# Patient Record
Sex: MALE | Race: WHITE | URBAN - METROPOLITAN AREA
[De-identification: names, ages, dates, MRNs, and addresses within clinical notes are randomized per-mention and may not be internally consistent; named-entity substitution may affect disease eponyms.]

---

## 2018-09-27 ENCOUNTER — OFFICE VISIT CONVERTED (OUTPATIENT)
Dept: GASTROENTEROLOGY | Facility: CLINIC | Age: 48
End: 2018-09-27
Attending: NURSE PRACTITIONER

## 2018-12-17 ENCOUNTER — OFFICE VISIT CONVERTED (OUTPATIENT)
Dept: GASTROENTEROLOGY | Facility: CLINIC | Age: 48
End: 2018-12-17
Attending: NURSE PRACTITIONER

## 2019-01-04 ENCOUNTER — HOSPITAL ENCOUNTER (OUTPATIENT)
Dept: LAB | Facility: HOSPITAL | Age: 49
Discharge: HOME | End: 2019-01-04
Attending: NURSE PRACTITIONER

## 2019-04-17 ENCOUNTER — OFFICE VISIT CONVERTED (OUTPATIENT)
Dept: GASTROENTEROLOGY | Facility: CLINIC | Age: 49
End: 2019-04-17
Attending: NURSE PRACTITIONER

## 2021-05-15 VITALS
SYSTOLIC BLOOD PRESSURE: 124 MMHG | WEIGHT: 187 LBS | HEIGHT: 73 IN | DIASTOLIC BLOOD PRESSURE: 84 MMHG | BODY MASS INDEX: 24.78 KG/M2

## 2021-05-15 VITALS
BODY MASS INDEX: 24.52 KG/M2 | SYSTOLIC BLOOD PRESSURE: 128 MMHG | DIASTOLIC BLOOD PRESSURE: 83 MMHG | WEIGHT: 185 LBS | HEIGHT: 73 IN

## 2021-05-16 VITALS
WEIGHT: 184.5 LBS | SYSTOLIC BLOOD PRESSURE: 126 MMHG | BODY MASS INDEX: 24.45 KG/M2 | HEIGHT: 73 IN | HEART RATE: 78 BPM | DIASTOLIC BLOOD PRESSURE: 90 MMHG

## 2022-05-14 ENCOUNTER — HOSPITAL ENCOUNTER (EMERGENCY)
Dept: HOSPITAL 5 - ED | Age: 52
Discharge: STILL ADMITTED OR ADMINISTRATIVE | End: 2022-05-14
Payer: SELF-PAY

## 2022-05-14 VITALS — DIASTOLIC BLOOD PRESSURE: 91 MMHG | SYSTOLIC BLOOD PRESSURE: 135 MMHG

## 2022-05-14 DIAGNOSIS — G93.41: ICD-10-CM

## 2022-05-14 DIAGNOSIS — I10: ICD-10-CM

## 2022-05-14 DIAGNOSIS — Z79.899: ICD-10-CM

## 2022-05-14 DIAGNOSIS — I50.21: ICD-10-CM

## 2022-05-14 DIAGNOSIS — F17.200: ICD-10-CM

## 2022-05-14 DIAGNOSIS — J96.01: Primary | ICD-10-CM

## 2022-05-14 LAB
ALBUMIN SERPL-MCNC: 3.2 G/DL (ref 3.9–5)
ALT SERPL-CCNC: 20 UNITS/L (ref 7–56)
BASOPHILS # (AUTO): 0 K/MM3 (ref 0–0.1)
BASOPHILS NFR BLD AUTO: 0.5 % (ref 0–1.8)
BILIRUB UR QL STRIP: (no result)
BLOOD UR QL VISUAL: (no result)
BUN SERPL-MCNC: 7 MG/DL (ref 9–20)
BUN/CREAT SERPL: 12 %
CALCIUM SERPL-MCNC: 8.5 MG/DL (ref 8.4–10.2)
EOSINOPHIL # BLD AUTO: 0 K/MM3 (ref 0–0.4)
EOSINOPHIL NFR BLD AUTO: 0.1 % (ref 0–4.3)
HCT VFR BLD CALC: 38.2 % (ref 35.5–45.6)
HEMOLYSIS INDEX: 1
HGB BLD-MCNC: 12.4 GM/DL (ref 11.8–15.2)
LYMPHOCYTES # BLD AUTO: 1.9 K/MM3 (ref 1.2–5.4)
LYMPHOCYTES NFR BLD AUTO: 20.6 % (ref 13.4–35)
MCHC RBC AUTO-ENTMCNC: 33 % (ref 32–34)
MCV RBC AUTO: 87 FL (ref 84–94)
MONOCYTES # (AUTO): 0.7 K/MM3 (ref 0–0.8)
MONOCYTES % (AUTO): 7.9 % (ref 0–7.3)
PH UR STRIP: 5 [PH] (ref 5–7)
PLATELET # BLD: 179 K/MM3 (ref 140–440)
RBC # BLD AUTO: 4.38 M/MM3 (ref 3.65–5.03)
RBC #/AREA URNS HPF: 0 /HPF (ref 0–6)
UROBILINOGEN UR-MCNC: 4 MG/DL (ref ?–2)
WBC #/AREA URNS HPF: 0 /HPF (ref 0–6)

## 2022-05-14 PROCEDURE — 80320 DRUG SCREEN QUANTALCOHOLS: CPT

## 2022-05-14 PROCEDURE — 83880 ASSAY OF NATRIURETIC PEPTIDE: CPT

## 2022-05-14 PROCEDURE — G0480 DRUG TEST DEF 1-7 CLASSES: HCPCS

## 2022-05-14 PROCEDURE — 99285 EMERGENCY DEPT VISIT HI MDM: CPT

## 2022-05-14 PROCEDURE — 96365 THER/PROPH/DIAG IV INF INIT: CPT

## 2022-05-14 PROCEDURE — 81001 URINALYSIS AUTO W/SCOPE: CPT

## 2022-05-14 PROCEDURE — 82140 ASSAY OF AMMONIA: CPT

## 2022-05-14 PROCEDURE — 83690 ASSAY OF LIPASE: CPT

## 2022-05-14 PROCEDURE — 93005 ELECTROCARDIOGRAM TRACING: CPT

## 2022-05-14 PROCEDURE — 96368 THER/DIAG CONCURRENT INF: CPT

## 2022-05-14 PROCEDURE — 84484 ASSAY OF TROPONIN QUANT: CPT

## 2022-05-14 PROCEDURE — 70450 CT HEAD/BRAIN W/O DYE: CPT

## 2022-05-14 PROCEDURE — 80053 COMPREHEN METABOLIC PANEL: CPT

## 2022-05-14 PROCEDURE — 71045 X-RAY EXAM CHEST 1 VIEW: CPT

## 2022-05-14 PROCEDURE — 96375 TX/PRO/DX INJ NEW DRUG ADDON: CPT

## 2022-05-14 PROCEDURE — 36415 COLL VENOUS BLD VENIPUNCTURE: CPT

## 2022-05-14 PROCEDURE — 96361 HYDRATE IV INFUSION ADD-ON: CPT

## 2022-05-14 PROCEDURE — 80307 DRUG TEST PRSMV CHEM ANLYZR: CPT

## 2022-05-14 PROCEDURE — 74177 CT ABD & PELVIS W/CONTRAST: CPT

## 2022-05-14 PROCEDURE — 85025 COMPLETE CBC W/AUTO DIFF WBC: CPT

## 2022-05-14 PROCEDURE — 87040 BLOOD CULTURE FOR BACTERIA: CPT

## 2022-05-14 NOTE — CAT SCAN REPORT
CT HEAD WITHOUT CONTRAST



INDICATION / CLINICAL INFORMATION: Altered mental status.



TECHNIQUE: All CT scans at this location are performed using CT dose reduction for ALARA by means of 
automated exposure control. 



COMPARISON: None available.



FINDINGS:



Motion artifact limits this exam.



BRAIN PARENCHYMA: No acute intracranial hemorrhage. No evidence of recent infarct. No mass effect or 
midline shift.

VENTRICULAR SYSTEM/EXTRA-AXIAL SPACES: Ventricles are normal for age. No extra-axial fluid collection
. 

ORBITS: Normal as visualized.

SKELETAL SYSTEM/SOFT TISSUES: Normal bones and soft tissues.

PARANASAL SINUSES/MASTOID AIR CELLS: No significant abnormality.



ADDITIONAL FINDINGS: None.



IMPRESSION:

1. No acute intracranial abnormality.



Signer Name: Ziyad Hoffmann MD 

Signed: 5/14/2022 10:45 AM

Workstation Name: [id number]

## 2022-05-14 NOTE — EMERGENCY DEPARTMENT REPORT
<JANUARY LÓPEZ - Last Filed: 05/14/22 05:49>





ED N/V/D HPI





- General


Chief complaint: Nausea/Vomiting/Diarrhea


Stated complaint: ABDOMINAL PAIN/HEMATEMESIS


Time Seen by Provider: 05/14/22 03:23


Source: patient, EMS


Mode of arrival: Stretcher


Limitations: No Limitations





- History of Present Illness


Initial comments: 





Patient is a 52-year-old male with apparent history of coronary artery disease 

with stent placement who presents emergency department complaint of chest pain, 

abdominal pain.  Patient is writhing in pain but cannot give much additional 

history.  He states that he has not been drinking although the EMS report states

that patient was drinking alcohol.





- Related Data


                                    Allergies











Allergy/AdvReac Type Severity Reaction Status Date / Time


 


No Known Allergies Allergy   Unverified 05/14/22 02:35














ED Review of Systems


Comment: Unobtainable due to pts medical conditions





ED Past Medical Hx





- Past Medical History


Previous Medical History?: Yes


Hx Hypertension: Yes


Hx Heart Attack/AMI: Yes


Hx Diabetes: Yes





- Surgical History


Past Surgical History?: No





- Social History


Smoking Status: Current Every Day Smoker


Substance Use Type: None





ED Physical Exam





- General


Limitations: No Limitations


General appearance: alert, in distress





- Head


Head exam: Present: atraumatic, normocephalic





- Eye


Eye exam: Present: normal appearance





- ENT


ENT exam: Present: mucous membranes dry





- Neck


Neck exam: Present: normal inspection





- Respiratory


Respiratory exam: Present: normal lung sounds bilaterally.  Absent: respiratory 

distress





- Cardiovascular


Cardiovascular Exam: Present: regular rate, normal rhythm.  Absent: systolic 

murmur, diastolic murmur, rubs, gallop





- GI/Abdominal


GI/Abdominal exam: Present: soft.  Absent: distended, tenderness





- Rectal


Rectal exam: Present: deferred





- Extremities Exam


Extremities exam: Present: normal inspection





- Back Exam


Back exam: Present: normal inspection





- Neurological Exam


Neurological exam: Present: alert, altered





- Psychiatric


Psychiatric exam: Present: other (Unable to assess)





- Skin


Skin exam: Present: warm, dry, intact, normal color.  Absent: rash





ED Course





- Reevaluation(s)


Reevaluation #1: 





05/14/22 05:51


Patient's chest x-ray shows possible pneumonia.  Given his blood cultures, 

lactic acid and antibiotics are ordered.





ED Medical Decision Making





- Lab Data


Result diagrams: 


                                 05/14/22 03:54





                                 05/14/22 03:54





- EKG Data


-: EKG Interpreted by Me


EKG shows normal: sinus rhythm


Rate: tachycardia


  ** No standard instances


Rhythm: PVC's


Axis/QRS: left axis deviation





- Radiology Data


Radiology results: report reviewed, image reviewed





- Medical Decision Making





Patient is a 52-year-old male presented emergency department with complaint of 

altered mental status, chest pain, abdominal pain.  The report was that patient 

was drinking alcohol.  Plan for work-up including chest x-ray, CT abdomen 

pelvis.  Given his altered ental status a CT scan of the brain is also ordered. 

Disposition pending work-up.  Differential includes gastritis, pancreatitis, 

ACS.





ED Disposition


Clinical Impression: 


 Acute respiratory failure with hypoxia, New onset of congestive heart failure, 

Metabolic encephalopathy





Disposition: 09 ADMITTED AS INPATIENT


Condition: Stable





<CAROLINA WAGNER - Last Filed: 05/14/22 11:19>





ED Review of Systems


ROS: 


Stated complaint: ABDOMINAL PAIN/HEMATEMESIS


Other details as noted in HPI








ED Course





                                   Vital Signs











  05/14/22 05/14/22 05/14/22





  02:00 03:10 03:16


 


Temperature 99.6 F  


 


Pulse Rate 109 H 109 H 108 H


 


Respiratory 20 22 24





Rate   


 


Blood Pressure 153/106  


 


Blood Pressure   





[Left]   


 


O2 Sat by Pulse 100 93 93





Oximetry   














  05/14/22 05/14/22 05/14/22





  03:30 03:46 04:00


 


Temperature   


 


Pulse Rate 111 H 110 H 


 


Respiratory 16 18 22





Rate   


 


Blood Pressure 173/109 173/109 179/115


 


Blood Pressure   





[Left]   


 


O2 Sat by Pulse 96 94 80 L





Oximetry   














  05/14/22 05/14/22





  04:16 10:51


 


Temperature  97.2 F L


 


Pulse Rate 128 H 102 H


 


Respiratory 11 L 20





Rate  


 


Blood Pressure 179/115 


 


Blood Pressure  180/124





[Left]  


 


O2 Sat by Pulse 95 97





Oximetry  














ED Medical Decision Making





- Lab Data


Result diagrams: 


                                 05/14/22 03:54





                                 05/14/22 03:54





- Medical Decision Making


Patient is a alert, awake and oriented x3.  CT brain is negative for acute 

finding.  CT abdomen is unremarkable.  Chest x-ray showed pulmonary edema.  Labs

reviewed and showed a BNP of more than 2000.  Patient is hypoxic 89% on room air

improved to 96% on 4 L.  Patient received Lasix 60 mg IV.  I discussed the 

patient with Dr. Jey, he agreed to admit the patient to medical service for 

further management.


Critical Care Time: Yes


Critical care time in (mins) excluding proc time.: 35


Critical care attestation.: 


If time is entered above; I have spent that time in minutes in the direct care 

of this critically ill patient, excluding procedure time.








ED Disposition


Is pt being admited?: Yes

## 2022-05-14 NOTE — CONSULTATION
History of Present Illness





- Reason for Consult


Consult date: 05/14/22


Medical Management


Requesting physician: CAROLINA WAGNER





- History of Present Illness


51 YO Male with HTN, DM, Nicotine Dependence, Medication Noncompliance presents 

to ED for evaluation.  Patient reports "I have been hanging out and I feel 

tired".  Patient states that he feels "sluggish" after hanging out all night 

with friends drinking alcohol and ingesting cocaine.  Patient's friends became 

concerned because they thought "he was sick".  EMS was notified and the patient 

was subsequently transported to [facility] for further care and evaluation of the 

aforementioned symptoms.  The patient was seen and evaluated in the emergency 

department.  All lab and imaging studies reviewed.  EKG did not reveal evidence 

of ischemia.  Cardiac enzymes were negative.  Patient found to have accelerated 

hypertension suspected secondary to medication noncompliance.  Patient denies 

fever, chills, chest pain, palpitation, adductive cough, skin rash, recent ill 

contacts, trauma, or known exposure to COVID-19.  Patient treated with medical 

optimization, diuretic therapy with resolution of symptoms.  Patient medically 

optimized and back to usual state of health.  Patient discharged home and 

instructed to follow-up primary care physician within 3 to 5 days with blood 

pressure log.  Patient instructed to follow-up with primary care physician for 

all age-appropriate and risk factor appropriate screening tests.  Patient 

counseled regarding cocaine cessation and alcohol cessation.  Patient instructed

to attend Alcoholics Anonymous meeting at discharge.  Patient was instructed to 

seek outpatient drug dependence counseling.








Past History


Past Medical History: diabetes, hypertension, other (See HPI)


Past Surgical History: No surgical history, Other (Reviewed)


Social history: single, Lives alone


Family history: diabetes, hypertension





Medications and Allergies


                                    Allergies











Allergy/AdvReac Type Severity Reaction Status Date / Time


 


No Known Allergies Allergy   Unverified 05/14/22 02:35











                                Home Medications











 Medication  Instructions  Recorded  Confirmed  Last Taken  Type


 


Aspirin 325 mg PO QDAY #30 tablet 05/14/22  Unknown Rx


 


Furosemide [Lasix TAB] 40 mg PO QDAY #30 tablet 05/14/22  Unknown Rx


 


Metoprolol [Lopressor TAB] 25 mg PO BID #60 tablet 05/14/22  Unknown Rx


 


Simvastatin 40 mg PO DAILY #30 05/14/22  Unknown Rx


 


amLODIPine 5 mg PO DAILY #30 tab 05/14/22  Unknown Rx


 


lisinopriL [Zestril TAB] 10 mg PO QDAY #30 tablet 05/14/22  Unknown Rx














Review of Systems


Constitutional: no weight loss, no weight gain, no fever, no chills


Ears, nose, mouth and throat: no ear pain, no ear discharge, no tinnitis, no 

decreased hearing, no nose pain, no nasal congestion


Cardiovascular: no chest pain, no orthopnea, no palpitations, no syncope, no 

lightheadedness


Respiratory: no cough, no cough with sputum, no excessive sputum, no hemoptysis,

no shortness of breath, no dyspnea on exertion


Gastrointestinal: no nausea, no diarrhea, no change in bowel habits, no 

hematemesis


Genitourinary Male: no hematuria, no flank pain, no discharge, no urinary 

frequency, no urinary hesitancy, no nocturia, no incontinence


Rectal: no pain, no incontinence, no bleeding


Musculoskeletal: no neck stiffness, no neck pain, no arm numbness/tingling, no 

low back pain, no shooting leg pain


Integumentary: no rash, no pruritis, no redness, no sores, no wounds


Neurological: no head injury, no transient paralysis, no paralysis, no weakness,

no parathesias, no numbness, no tingling, no syncope, no tremors, no ataxia


Psychiatric: no anxiety, no memory loss, no change in sleep habits, no sleep 

disturbances, no hypersomnia, no change in appetite, no change in libido


Endocrine: no cold intolerance, no heat intolerance, no polyphagia, no excessive

thirst, no polydipsia, no nocturia, no flushing


Hematologic/Lymphatic: no easy bruising, no easy bleeding, no lymphadenopathy


Allergic/Immunologic: no urticaria, no allergic rhinitis, no wheezing, no 

persistent infections (Right cervical)





Exam





- Constitutional


Vitals: 


                                        











Temp Pulse Resp BP Pulse Ox


 


 97.2 F L  100 H  18   199/99   99 


 


 05/14/22 10:51  05/14/22 13:48  05/14/22 13:48  05/14/22 13:48  05/14/22 13:48











General appearance: Present: no acute distress, obese





- EENT


Eyes: Present: PERRL


ENT: hearing intact, clear oral mucosa





- Neck


Neck: Present: supple, normal ROM





- Respiratory


Respiratory effort: normal


Respiratory: bilateral: CTA





- Cardiovascular


Heart Sounds: Present: S1 & S2.  Absent: rub, click





- Extremities


Extremities: pulses symmetrical, No edema


Peripheral Pulses: within normal limits





- Abdominal


General gastrointestinal: Present: soft, non-tender, non-distended, normal bowel

sounds


Male genitourinary: Present: normal





- Integumentary


Integumentary: Present: clear, warm, dry





- Musculoskeletal


Musculoskeletal: gait normal, strength equal bilaterally





- Psychiatric


Psychiatric: appropriate mood/affect, intact judgment & insight





- Neurologic


Neurologic: CNII-XII intact, moves all extremities





Results





- Labs


CBC & Chem 7: 


                                 05/14/22 03:54





                                 05/14/22 03:54


Labs: 


                              Abnormal lab results











  05/14/22 05/14/22 05/14/22 Range/Units





  03:54 03:54 05:41 


 


RDW  18.0 H    (13.2-15.2)  %


 


Mono % (Auto)  7.9 H    (0.0-7.3)  %


 


Seg Neutrophils %  70.9 H    (40.0-70.0)  %


 


Sodium   135 L   (137-145)  mmol/L


 


BUN   7 L   (9-20)  mg/dL


 


Creatinine   0.6 L   (0.8-1.3)  mg/dL


 


Glucose   131 H   ()  mg/dL


 


Total Bilirubin   1.30 H   (0.1-1.2)  mg/dL


 


NT-Pro-B Natriuret Pep    2923 H  (0-900)  pg/mL


 


Albumin   3.2 L   (3.9-5)  g/dL


 


Lipase   7 L   (13-60)  units/L














Assessment and Plan





- Patient Problems


(1) Cocaine dependence


Status: Acute   





(2) Hypertension


Status: Acute   


Qualifiers: 


   Hypertension type: primary hypertension   Qualified Code(s): I10 - Essential 

(primary) hypertension   


Plan to address problem: 


Patient instructed to resume prehospital antihypertensive therapy.  Patient res

tarted on antihypertensive regimen.  Patient instructed to follow-up with 

primary care physician in 3 to 5 days with blood pressure log.








(3) Noncompliance with medication regimen


Status: Acute   


Plan to address problem: 


Patient counseled.  Patient informed that continued noncompliance may result in 

worsening symptoms.  Patient acknowledges understanding instructions.








(4) Advance care planning


Status: Acute   


Plan to address problem: 


Disease education data, care plan discussed, diagnoses discussed, prognosis 

discussed, patient is full code, +30 minutes.  Patient acknowledges 

understanding and agreement with care plan.








(5) Preventative health care


Status: Acute   


Plan to address problem: 


Patient counseled regarding increase physical activity, balanced diet, low-

cholesterol diet, risk factor reduction, smoking cessation, cocaine cessation, 

alcohol cessation.  +30 minutes.

## 2022-05-14 NOTE — XRAY REPORT
XR chest 1V ap



INDICATION / CLINICAL INFORMATION:

chest pain. 



COMPARISON: 

None available.



FINDINGS:



SUPPORT DEVICES: None.



HEART /PULMONARY VASCULATURE: Cardiac enlargement. Pulmonary vasculature is mildly congested. 



LUNGS / PLEURA: Patchy bilateral perihilar opacities, right greater than left. No sizable pleural eff
usion or pneumothorax.



IMPRESSION:

Patchy right greater than left perihilar airspace opacities, may reflect edema or pneumonia.



Signer Name: Wilmer Ceron MD 

Signed: 5/14/2022 4:49 AM

Workstation Name: [id number]

## 2022-05-17 NOTE — ELECTROCARDIOGRAPH REPORT
[facility]

                                       

Test Date:    2022               Test Time:    04:16:25

Pat Name:     AYAAN GOMEZ             Department:   

Patient ID:   [id number]          Room:          

Gender:       M                        Technician:   CHERY

:          [id number]               Requested By: CAROLINA WAGNER

Order Number: [id number]              Reading MD:   Azar Au

                                 Measurements

Intervals                              Axis          

Rate:         108                      P:            58

ND:           140                      QRS:          260

QRSD:         76                       T:            68

QT:           362                                    

QTc:          484                                    

                           Interpretive Statements

Sinus tachycardia with occasional PVC

Right axis deviation

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

Old anterior lateral

Abnormal ECG

No previous ECG available for comparison

Electronically Signed On 2022 18:35:27 EDT by Azar Au

## 2022-06-12 ENCOUNTER — HOSPITAL ENCOUNTER (INPATIENT)
Dept: HOSPITAL 5 - ED | Age: 52
LOS: 3 days | Discharge: HOME | DRG: 193 | End: 2022-06-15
Attending: STUDENT IN AN ORGANIZED HEALTH CARE EDUCATION/TRAINING PROGRAM | Admitting: INTERNAL MEDICINE
Payer: SELF-PAY

## 2022-06-12 DIAGNOSIS — E11.9: ICD-10-CM

## 2022-06-12 DIAGNOSIS — Z82.49: ICD-10-CM

## 2022-06-12 DIAGNOSIS — F10.20: ICD-10-CM

## 2022-06-12 DIAGNOSIS — Z71.41: ICD-10-CM

## 2022-06-12 DIAGNOSIS — Z20.822: ICD-10-CM

## 2022-06-12 DIAGNOSIS — E66.9: ICD-10-CM

## 2022-06-12 DIAGNOSIS — J18.9: Primary | ICD-10-CM

## 2022-06-12 DIAGNOSIS — Z79.899: ICD-10-CM

## 2022-06-12 DIAGNOSIS — I10: ICD-10-CM

## 2022-06-12 DIAGNOSIS — I25.10: ICD-10-CM

## 2022-06-12 DIAGNOSIS — F14.20: ICD-10-CM

## 2022-06-12 DIAGNOSIS — Z95.5: ICD-10-CM

## 2022-06-12 DIAGNOSIS — I42.8: ICD-10-CM

## 2022-06-12 DIAGNOSIS — J96.01: ICD-10-CM

## 2022-06-12 DIAGNOSIS — E44.1: ICD-10-CM

## 2022-06-12 LAB
ALBUMIN SERPL-MCNC: 3.6 G/DL (ref 3.9–5)
ALT SERPL-CCNC: 26 UNITS/L (ref 7–56)
APTT BLD: 29.1 SEC. (ref 24.2–36.6)
BASOPHILS # (AUTO): 0.2 K/MM3 (ref 0–0.1)
BASOPHILS NFR BLD AUTO: 2.7 % (ref 0–1.8)
BILIRUB UR QL STRIP: (no result)
BLOOD UR QL VISUAL: (no result)
BUN SERPL-MCNC: 6 MG/DL (ref 9–20)
BUN/CREAT SERPL: 12 %
CALCIUM SERPL-MCNC: 8.2 MG/DL (ref 8.4–10.2)
CRP SERPL-MCNC: 5.5 MG/DL (ref 0–1.3)
EOSINOPHIL # BLD AUTO: 0.1 K/MM3 (ref 0–0.4)
EOSINOPHIL NFR BLD AUTO: 1.6 % (ref 0–4.3)
HCO3 BLDA-SCNC: 25.2 MMOL/L (ref 20–26)
HCO3 BLDA-SCNC: 26.3 MMOL/L (ref 20–26)
HCT VFR BLD CALC: 42.4 % (ref 35.5–45.6)
HEMOLYSIS INDEX: 2
HGB BLD-MCNC: 13.8 GM/DL (ref 11.8–15.2)
INR PPP: 1.13 (ref 0.87–1.13)
LYMPHOCYTES # BLD AUTO: 1.2 K/MM3 (ref 1.2–5.4)
LYMPHOCYTES NFR BLD AUTO: 17.5 % (ref 13.4–35)
MCHC RBC AUTO-ENTMCNC: 33 % (ref 32–34)
MCV RBC AUTO: 89 FL (ref 84–94)
MONOCYTES # (AUTO): 0.3 K/MM3 (ref 0–0.8)
MONOCYTES % (AUTO): 4.5 % (ref 0–7.3)
MUCOUS THREADS #/AREA URNS HPF: (no result) /HPF
PCO2 BLDA: 42.1 MM HG
PCO2 BLDA: 46.4 MM HG
PH BLDA: 7.37 PH UNITS (ref 7.35–7.45)
PH BLDA: 7.39 PH UNITS (ref 7.35–7.45)
PH UR STRIP: 6 [PH] (ref 5–7)
PLATELET # BLD: 127 K/MM3 (ref 140–440)
PO2 BLDA: 38.6 MM HG (ref 80–90)
PO2 BLDA: 62.6 MM HG (ref 80–90)
RBC # BLD AUTO: 4.74 M/MM3 (ref 3.65–5.03)
RBC #/AREA URNS HPF: 1 /HPF (ref 0–6)
UROBILINOGEN UR-MCNC: 4 MG/DL (ref ?–2)
WBC #/AREA URNS HPF: 1 /HPF (ref 0–6)

## 2022-06-12 PROCEDURE — 85379 FIBRIN DEGRADATION QUANT: CPT

## 2022-06-12 PROCEDURE — 82140 ASSAY OF AMMONIA: CPT

## 2022-06-12 PROCEDURE — 85610 PROTHROMBIN TIME: CPT

## 2022-06-12 PROCEDURE — 82803 BLOOD GASES ANY COMBINATION: CPT

## 2022-06-12 PROCEDURE — 71260 CT THORAX DX C+: CPT

## 2022-06-12 PROCEDURE — 85025 COMPLETE CBC W/AUTO DIFF WBC: CPT

## 2022-06-12 PROCEDURE — 80307 DRUG TEST PRSMV CHEM ANLYZR: CPT

## 2022-06-12 PROCEDURE — 94760 N-INVAS EAR/PLS OXIMETRY 1: CPT

## 2022-06-12 PROCEDURE — 36415 COLL VENOUS BLD VENIPUNCTURE: CPT

## 2022-06-12 PROCEDURE — 84484 ASSAY OF TROPONIN QUANT: CPT

## 2022-06-12 PROCEDURE — 83880 ASSAY OF NATRIURETIC PEPTIDE: CPT

## 2022-06-12 PROCEDURE — U0003 INFECTIOUS AGENT DETECTION BY NUCLEIC ACID (DNA OR RNA); SEVERE ACUTE RESPIRATORY SYNDROME CORONAVIRUS 2 (SARS-COV-2) (CORONAVIRUS DISEASE [COVID-19]), AMPLIFIED PROBE TECHNIQUE, MAKING USE OF HIGH THROUGHPUT TECHNOLOGIES AS DESCRIBED BY CMS-2020-01-R: HCPCS

## 2022-06-12 PROCEDURE — C8929 TTE W OR WO FOL WCON,DOPPLER: HCPCS

## 2022-06-12 PROCEDURE — 94640 AIRWAY INHALATION TREATMENT: CPT

## 2022-06-12 PROCEDURE — 71045 X-RAY EXAM CHEST 1 VIEW: CPT

## 2022-06-12 PROCEDURE — 70450 CT HEAD/BRAIN W/O DYE: CPT

## 2022-06-12 PROCEDURE — 94644 CONT INHLJ TX 1ST HOUR: CPT

## 2022-06-12 PROCEDURE — G0480 DRUG TEST DEF 1-7 CLASSES: HCPCS

## 2022-06-12 PROCEDURE — 80320 DRUG SCREEN QUANTALCOHOLS: CPT

## 2022-06-12 PROCEDURE — 82728 ASSAY OF FERRITIN: CPT

## 2022-06-12 PROCEDURE — 82947 ASSAY GLUCOSE BLOOD QUANT: CPT

## 2022-06-12 PROCEDURE — 4A033R1 MEASUREMENT OF ARTERIAL SATURATION, PERIPHERAL, PERCUTANEOUS APPROACH: ICD-10-PCS | Performed by: STUDENT IN AN ORGANIZED HEALTH CARE EDUCATION/TRAINING PROGRAM

## 2022-06-12 PROCEDURE — 85730 THROMBOPLASTIN TIME PARTIAL: CPT

## 2022-06-12 PROCEDURE — 93005 ELECTROCARDIOGRAM TRACING: CPT

## 2022-06-12 PROCEDURE — 80053 COMPREHEN METABOLIC PANEL: CPT

## 2022-06-12 PROCEDURE — 86140 C-REACTIVE PROTEIN: CPT

## 2022-06-12 PROCEDURE — 80048 BASIC METABOLIC PNL TOTAL CA: CPT

## 2022-06-12 PROCEDURE — 93306 TTE W/DOPPLER COMPLETE: CPT

## 2022-06-12 PROCEDURE — 71275 CT ANGIOGRAPHY CHEST: CPT

## 2022-06-12 PROCEDURE — 81001 URINALYSIS AUTO W/SCOPE: CPT

## 2022-06-12 PROCEDURE — 83615 LACTATE (LD) (LDH) ENZYME: CPT

## 2022-06-12 PROCEDURE — 87040 BLOOD CULTURE FOR BACTERIA: CPT

## 2022-06-12 PROCEDURE — 83735 ASSAY OF MAGNESIUM: CPT

## 2022-06-12 PROCEDURE — 84145 PROCALCITONIN (PCT): CPT

## 2022-06-12 RX ADMIN — METHYLPREDNISOLONE SODIUM SUCCINATE SCH MG: 40 INJECTION, POWDER, FOR SOLUTION INTRAMUSCULAR; INTRAVENOUS at 18:33

## 2022-06-12 NOTE — HISTORY AND PHYSICAL REPORT
History of Present Illness


Chief complaint: 





I feel short of breath


History of present illness: 


51 YO Male with HTN, Asthma, CAD S/P Stent Placement, DM presents to ED for 

evaluation.  Patient is lethargic at time of evaluation and provides minimal 

history.  Patient reports "I feel short of breath".  Additional history taken 

EMS staff, as well as ED staff.  EMS was notified for the aforementioned 

symptoms and upon arrival to the patient's extended stay hotel room the patient 

was found to be in distress and subsequently transported to [facility] for further care

and evaluation of the aforementioned symptoms.  The patient was seen and 

evaluated in the emergency department.  All lab and imaging studies reviewed.  

Patient found to have a pulse oximetry of 88% on room air which is consistent 

with acute hypoxemic respiratory failure.  Chest x-ray revealed bilateral 

pneumonia.  Patient admitted to medical floor and initiated on pneumonia 

protocol as well as coronavirus protocol due to increased risk of worsening 

symptoms and for medical stabilization.  No reports of fever, chills, chest 

pain, palpitation, skin rash, recent contact, productive cough, known exposure 

to COVID-19.  No prior admission for review.  No medication listed at time of 

admission for reconciliation.  Advanced care planning conducted in ED.  Patient 

has diminished cognition at time of evaluation but has a positive gag reflex and

is able to protect his airway without difficulty.








Past History


Past Medical History: CAD, diabetes, hypertension


Past Surgical History: Other (Cardiac stent placement)


Social history: single, alcohol abuse.  denies: smoking


Family history: hypertension





Medications and Allergies


                                    Allergies











Allergy/AdvReac Type Severity Reaction Status Date / Time


 


No Known Allergies Allergy   Unverified 06/12/22 09:22











Active Meds: 


Active Medications





Acetaminophen (Acetaminophen 325 Mg Tab)  650 mg PO Q4H PRN


   PRN Reason: Pain MILD(1-3)/Fever >100.5/HA


Albuterol (Albuterol 2.5 Mg/3 Ml Nebu)  2.5 mg IH Q4HRT PRN


   PRN Reason: Shortness Of Breath


Ascorbic Acid (Ascorbic Acid 500 Mg Tab)  500 mg PO BID DELLA


Azithromycin (Azithromycin 250 Mg Tab)  500 mg PO QDAY DELLA; Protocol


Cholecalciferol (Cholecalciferol (Vit D3) 400 Unit Tab)  1,000 unit PO QDAY DELLA


Heparin Sodium (Porcine) (Heparin 5,000 Unit/1 Ml Vial)  5,000 unit SUB-Q Q12HR 

DELLA


Hydromorphone HCl (Hydromorphone 0.5 Mg/0.5 Ml Inj)  0.25 mg IV Q23H PRN


   PRN Reason: Pain, Moderate (4-6)


Ceftriaxone Sodium (Rocephin/Ns 2 Gm/100 Ml)  2 gm in 100 mls @ 200 mls/hr IV 

Q24H DELLA; Protocol


Methylprednisolone Sodium Succinate (Methylprednisolone Sod Succinate 40 Mg/1 Ml

Inj)  40 mg IV Q8HR DELLA


Ondansetron HCl (Ondansetron 4 Mg/2 Ml Inj)  4 mg IV Q8H PRN


   PRN Reason: Nausea And Vomiting


Oxycodone/Acetaminophen (Oxycodone /Acetaminophen 5-325mg Tab)  1 tab PO Q16H 

PRN


   PRN Reason: Pain, Moderate (4-6)


Sodium Chloride (Sodium Chloride 0.9% 10 Ml Flush Syringe)  10 ml IV BID DELLA


Sodium Chloride (Sodium Chloride 0.9% 10 Ml Flush Syringe)  10 ml IV PRN PRN


   PRN Reason: LINE FLUSH


Zinc Sulfate (Zinc Sulfate 220 Mg Cap)  220 mg PO BID [facility]











Review of Systems


ROS unobtainable: due to mental status





Exam





- Constitutional


Vitals: 


                                        











Temp Pulse Resp BP Pulse Ox


 


 98.8 F   112 H  21   171/106   97 


 


 06/12/22 10:09  06/12/22 13:31  06/12/22 13:31  06/12/22 13:31  06/12/22 13:31











General appearance: Present: mild distress





- EENT


Eyes: Present: PERRL


ENT: hearing intact, clear oral mucosa





- Neck


Neck: Present: supple, normal ROM





- Respiratory


Respiratory effort: labored, accessory muscle use


Respiratory: bilateral: diminished, rhonchi





- Cardiovascular


Heart Sounds: Present: S1 & S2.  Absent: rub, click





- Extremities


Extremities: pulses symmetrical, No edema


Peripheral Pulses: within normal limits





- Abdominal


General gastrointestinal: Present: soft, non-tender, non-distended, normal bowel

sounds


Male genitourinary: Present: normal





- Integumentary


Integumentary: Present: clear, warm, dry





- Musculoskeletal


Musculoskeletal: gait normal, strength equal bilaterally





- Psychiatric


Psychiatric: no appropriate mood/affect, no intact judgment & insight





- Neurologic


Neurologic: CNII-XII intact, moves all extremities, no gait normal





HEART Score





- HEART Score


Troponin: 


                                        











Troponin T  < 0.010 ng/mL (0.00-0.029)   06/12/22  12:32    














Results





- Labs


CBC & Chem 7: 


                                 06/12/22 10:30





                                 06/12/22 14:34


Labs: 


                              Abnormal lab results











  06/12/22 06/12/22 06/12/22 Range/Units





  10:10 10:30 10:30 


 


RDW   19.5 H   (13.2-15.2)  %


 


Plt Count   127 L   (140-440)  K/mm3


 


Baso % (Auto)   2.7 H   (0.0-1.8)  %


 


Baso # (Auto)   0.2 H   (0.0-0.1)  K/mm3


 


Seg Neutrophils %   73.7 H   (40.0-70.0)  %


 


PT    15.8 H  (12.2-14.9)  Sec.


 


ABG pO2  38.6 L*    (80.0-90.0)  mm Hg


 


ABG HCO3  26.3 H    (20.0-26.0)  mmol/L


 


ABG O2 Saturation  71.5 L    (95.0-99.0)  %


 


ABG Hemoglobin  13.6 L    (14.0-18.0)  gm/dl


 


Oxyhemoglobin  68.9 L    (95.0-99.0)  %


 


Potassium     (3.6-5.0)  mmol/L


 


BUN     (9-20)  mg/dL


 


Creatinine     (0.8-1.3)  mg/dL


 


Glucose     ()  mg/dL


 


Calcium     (8.4-10.2)  mg/dL


 


Magnesium     (1.7-2.3)  mg/dL


 


Ammonia     (25-60)  umol/L


 


NT-Pro-B Natriuret Pep     (0-900)  pg/mL


 


Albumin     (3.9-5)  g/dL














  06/12/22 06/12/22 06/12/22 Range/Units





  10:30 10:30 10:30 


 


RDW     (13.2-15.2)  %


 


Plt Count     (140-440)  K/mm3


 


Baso % (Auto)     (0.0-1.8)  %


 


Baso # (Auto)     (0.0-0.1)  K/mm3


 


Seg Neutrophils %     (40.0-70.0)  %


 


PT     (12.2-14.9)  Sec.


 


ABG pO2     (80.0-90.0)  mm Hg


 


ABG HCO3     (20.0-26.0)  mmol/L


 


ABG O2 Saturation     (95.0-99.0)  %


 


ABG Hemoglobin     (14.0-18.0)  gm/dl


 


Oxyhemoglobin     (95.0-99.0)  %


 


Potassium  3.5 L    (3.6-5.0)  mmol/L


 


BUN  6 L    (9-20)  mg/dL


 


Creatinine  0.5 L    (0.8-1.3)  mg/dL


 


Glucose  146 H    ()  mg/dL


 


Calcium  8.2 L    (8.4-10.2)  mg/dL


 


Magnesium  2.50 H    (1.7-2.3)  mg/dL


 


Ammonia    67.0 H  (25-60)  umol/L


 


NT-Pro-B Natriuret Pep   2101 H   (0-900)  pg/mL


 


Albumin  3.6 L    (3.9-5)  g/dL














  06/12/22 Range/Units





  11:50 


 


RDW   (13.2-15.2)  %


 


Plt Count   (140-440)  K/mm3


 


Baso % (Auto)   (0.0-1.8)  %


 


Baso # (Auto)   (0.0-0.1)  K/mm3


 


Seg Neutrophils %   (40.0-70.0)  %


 


PT   (12.2-14.9)  Sec.


 


ABG pO2  62.6 L  (80.0-90.0)  mm Hg


 


ABG HCO3   (20.0-26.0)  mmol/L


 


ABG O2 Saturation  93.4 L  (95.0-99.0)  %


 


ABG Hemoglobin  13.9 L  (14.0-18.0)  gm/dl


 


Oxyhemoglobin  90.1 L  (95.0-99.0)  %


 


Potassium   (3.6-5.0)  mmol/L


 


BUN   (9-20)  mg/dL


 


Creatinine   (0.8-1.3)  mg/dL


 


Glucose   ()  mg/dL


 


Calcium   (8.4-10.2)  mg/dL


 


Magnesium   (1.7-2.3)  mg/dL


 


Ammonia   (25-60)  umol/L


 


NT-Pro-B Natriuret Pep   (0-900)  pg/mL


 


Albumin   (3.9-5)  g/dL














Assessment and Plan





- Patient Problems


(1) Acute hypoxemic respiratory failure


Current Visit: Yes   Status: Acute   





(2) Pneumonia


Current Visit: Yes   Status: Acute   


Plan to address problem: 


Chest x-ray, supplemental oxygen, pulse oximetry, nebulizer therapy, IV 

antibiotic therapy, blood culture.








(3) Suspected 2019 novel coronavirus infection


Current Visit: Yes   Status: Acute   


Plan to address problem: 


Coronavirus protocol: Chest x-ray, supplemental oxygen, pulse oximetry, IV 

antibiotic therapy, IV steroid therapy, prophylactic anticoagulation, vitamin C 

therapy, vitamin D therapy, zinc therapy,








(4) Cocaine dependence


Current Visit: Yes   Status: Acute   


Qualifiers: 


   Complication of substance-induced condition: with unspecified complication 


Plan to address problem: 


Patient counseled regarding absence from cocaine.  Patient instructed to seek 

outpatient drug dependence counseling at discharge.








(5) Alcohol dependence


Current Visit: Yes   Status: Acute   


Qualifiers: 


   Complication of substance-induced condition: with unspecified complication 


Plan to address problem: 


CIWA protocol, thiamine, folic acid, multivitamin daily, banana bag.  Supportive

care.








(6) Accelerated hypertension


Current Visit: Yes   Status: Acute   


Plan to address problem: 


Monitor blood pressure every shift, continue medical management.








(7) Cardiomyopathy


Current Visit: Yes   Status: Acute   


Plan to address problem: 


Patient found to have an elevated BNP.  Echocardiogram ordered and pending at 

time of admission, supportive care.  Patient symptomatology suspected secondary 

to cocaine dependence.








(8) DVT prophylaxis


Current Visit: Yes   Status: Acute   


Plan to address problem: 


SCD to bilateral lower extremities while in bed








(9) Advance care planning


Current Visit: Yes   Status: Acute   


Plan to address problem: 


Disease education data, care plan discussed, diagnosis discussed, prognosis 

discussed, patient is full code.  Patient knowledges understanding agreement 

with care plan, +30 minutes.








(10) Preventative health care


Current Visit: Yes   Status: Acute   


Plan to address problem: 


Patient counseled regarding balanced diet, medication compliance, outpatient 

follow-up with primary care physician for all age and risk factor appropriate 

screening test.  +30 minutes.

## 2022-06-12 NOTE — XRAY REPORT
CHEST 1 VIEW



INDICATION / CLINICAL INFORMATION: Dyspnea.



COMPARISON: None available.



FINDINGS:



SUPPORT DEVICES: None.

HEART / MEDIASTINUM: Enlarged 

LUNGS / PLEURA: Patchy perihilar and bibasilar airspace opacities. No focal consolidation or signific
ant effusion. No pneumothorax. 



ADDITIONAL FINDINGS: No significant additional findings.



IMPRESSION:

1.  Patchy perihilar and bibasilar airspace opacities, suspicious for multifocal pneumonia. Mild pulm
onary edema is additional consideration.

2.  Cardiomegaly.



Signer Name: Hugo Ng MD 

Signed: 6/12/2022 9:57 AM

Workstation Name: [id number]

## 2022-06-12 NOTE — EMERGENCY DEPARTMENT REPORT
ED Shortness of Breath HPI





- General


Stated Complaint: GIDEON/CP


Time Seen by Provider: 06/12/22 09:20


Source: patient, EMS, old records reviewed (none available)


Mode of arrival: Stretcher


Limitations: No Limitations





- History of Present Illness


Initial Comments: 





52-year-old male presents with EMS with complaints of shortness of breath.  No 

previous medical record available for review.  EMS reports they picked him up 

from an extended stay with child's other individuals in the room.  Patient 

reports a past medical history of hypertension, asthma, CAD with stent, 

insulin-dependent diabetes.  He has been short of breath with chest pain for a 

few days.  Patient was wheezing upon EMS arrival.  He was treated with albuterol

7.5 mg, Atrovent 0.5, aspirin, and nitroglycerin.  EMS reports noting multiple 

PVCs and several episodes of nonsustained V. tach.  Rhythm strip not available 

for review.  Patient apparently was drinking all night but denies being 

alcoholic.  He denies other substance abuse.  Patient appears agitated with 

intermittent movement in the bed but states he is acting this way because he is 

cold.  EMS reports that people at the scene report that his mental status was 

normal.  Patient does appear to be mildly drowsy upon arrival.  Stat blood 

glucose check and ABG ordered to rule out CO2 retention





- Related Data


                                    Allergies











Allergy/AdvReac Type Severity Reaction Status Date / Time


 


No Known Allergies Allergy   Unverified 06/12/22 09:22














ED Review of Systems


ROS: 


Stated complaint: GIDEON/CP


Other details as noted in HPI





Comment: All other systems reviewed and negative





ED Physical Exam





- Other


Other exam information: 





General: No acute distress


Head: Atraumatic


Eyes: normal appearance


ENT: Moist mucous membranes


Neck: Normal appearance, no midline tenderness


Chest: Bilateral wheezing without tachypnea or accessory muscle use


CV: Regular rate and rhythm


Abdomen: Soft, normal bowel sounds, nontender, nondistended, no rebound or 

guarding


Back: Normal inspection


Extremity: Bilateral lower extremity pitting edema 2+


Neuro: Appears drowsy but easily arousable to voice, oriented x3.  No facial 

asymmetry, speech mildly slurred, equal handgrip and foot dorsiflexion


Psych: Cooperative


Skin: No rash





ED Course


                                   Vital Signs











  06/12/22 06/12/22 06/12/22





  09:27 09:31 09:36


 


Temperature   98.8 F


 


Pulse Rate  105 H 105 H


 


Pulse Rate [   





Bilateral   





Throughout]   


 


Respiratory  19 17





Rate   


 


Respiratory   





Rate [Bilateral   





Throughout]   


 


Blood Pressure  160/92 146/95


 


Blood Pressure   146/95





[Left]   


 


O2 Sat by Pulse 95 92 94





Oximetry   














  06/12/22 06/12/22 06/12/22





  09:45 10:01 10:09


 


Temperature   98.8 F


 


Pulse Rate 107 H 107 H 


 


Pulse Rate [   





Bilateral   





Throughout]   


 


Respiratory 18 19 30 H





Rate   


 


Respiratory   





Rate [Bilateral   





Throughout]   


 


Blood Pressure 146/95 146/95 


 


Blood Pressure   176/114





[Left]   


 


O2 Sat by Pulse 95 95 90





Oximetry   














  06/12/22 06/12/22 06/12/22





  10:15 10:20 10:31


 


Temperature   


 


Pulse Rate 104 H  103 H


 


Pulse Rate [  108 H 





Bilateral   





Throughout]   


 


Respiratory 20  20





Rate   


 


Respiratory  18 





Rate [Bilateral   





Throughout]   


 


Blood Pressure 176/114  129/102


 


Blood Pressure   





[Left]   


 


O2 Sat by Pulse 92  99





Oximetry   














  06/12/22 06/12/22 06/12/22





  10:45 11:01 13:31


 


Temperature   


 


Pulse Rate 110 H 108 H 112 H


 


Pulse Rate [   





Bilateral   





Throughout]   


 


Respiratory 23 26 H 21





Rate   


 


Respiratory   





Rate [Bilateral   





Throughout]   


 


Blood Pressure 164/110 169/110 


 


Blood Pressure   171/106





[Left]   


 


O2 Sat by Pulse 95 99 97





Oximetry   














- Reevaluation(s)


Reevaluation #1: 





06/12/22 10:45


ABG results noted.  Discussed with respiratory therapist.  Likely mixed venous. 

 Will repeat





ED Medical Decision Making





- Lab Data


Result diagrams: 


                                 06/12/22 10:30





                                 06/12/22 10:30








                                   Lab Results











  06/12/22 06/12/22 06/12/22 Range/Units





  09:33 10:10 10:30 


 


WBC    6.9  (4.5-11.0)  K/mm3


 


RBC    4.74  (3.65-5.03)  M/mm3


 


Hgb    13.8  (11.8-15.2)  gm/dl


 


Hct    42.4  (35.5-45.6)  %


 


MCV    89  (84-94)  fl


 


MCH    29  (28-32)  pg


 


MCHC    33  (32-34)  %


 


RDW    19.5 H  (13.2-15.2)  %


 


Plt Count    127 L  (140-440)  K/mm3


 


Lymph % (Auto)    17.5  (13.4-35.0)  %


 


Mono % (Auto)    4.5  (0.0-7.3)  %


 


Eos % (Auto)    1.6  (0.0-4.3)  %


 


Baso % (Auto)    2.7 H  (0.0-1.8)  %


 


Lymph # (Auto)    1.2  (1.2-5.4)  K/mm3


 


Mono # (Auto)    0.3  (0.0-0.8)  K/mm3


 


Eos # (Auto)    0.1  (0.0-0.4)  K/mm3


 


Baso # (Auto)    0.2 H  (0.0-0.1)  K/mm3


 


Seg Neutrophils %    73.7 H  (40.0-70.0)  %


 


Seg Neutrophils #    5.1  (1.8-7.7)  K/mm3


 


PT     (12.2-14.9)  Sec.


 


INR     (0.87-1.13)  


 


APTT     (24.2-36.6)  Sec.


 


ABG pH   7.370   (7.350-7.450)  pH Units


 


ABG pCO2   46.4   mm Hg


 


ABG pO2   38.6 L*   (80.0-90.0)  mm Hg


 


ABG HCO3   26.3 H   (20.0-26.0)  mmol/L


 


ABG O2 Saturation   71.5 L   (95.0-99.0)  %


 


ABG O2 Content   13.2   (0.0-44)  


 


ABG Base Excess   0.5   (-2.0-3.0)  mmol/L


 


ABG Hemoglobin   13.6 L   (14.0-18.0)  gm/dl


 


ABG Carboxyhemoglobin   2.9   (0.0-5.0)  %


 


ABG Methemoglobin   0.7   (0.0-1.5)  %


 


Oxyhemoglobin   68.9 L   (95.0-99.0)  %


 


FiO2   21   %


 


Sodium     (137-145)  mmol/L


 


Potassium     (3.6-5.0)  mmol/L


 


Chloride     ()  mmol/L


 


Carbon Dioxide     (22-30)  mmol/L


 


Anion Gap     mmol/L


 


BUN     (9-20)  mg/dL


 


Creatinine     (0.8-1.3)  mg/dL


 


Estimated GFR     ml/min


 


BUN/Creatinine Ratio     %


 


Glucose     ()  mg/dL


 


Calcium     (8.4-10.2)  mg/dL


 


Magnesium     (1.7-2.3)  mg/dL


 


Total Bilirubin     (0.1-1.2)  mg/dL


 


AST     (5-40)  units/L


 


ALT     (7-56)  units/L


 


Alkaline Phosphatase     ()  units/L


 


Ammonia     (25-60)  umol/L


 


Troponin T     (0.00-0.029)  ng/mL


 


NT-Pro-B Natriuret Pep     (0-900)  pg/mL


 


Total Protein     (6.3-8.2)  g/dL


 


Albumin     (3.9-5)  g/dL


 


Albumin/Globulin Ratio     %


 


Urine Color  Yellow    (Yellow)  


 


Urine Turbidity  Clear    (Clear)  


 


Urine pH  6.0    (5.0-7.0)  


 


Ur Specific Gravity  1.016    (1.003-1.030)  


 


Urine Protein  30 mg/dl    (Negative)  mg/dL


 


Urine Glucose (UA)  50    (Negative)  mg/dL


 


Urine Ketones  Neg    (Negative)  mg/dL


 


Urine Blood  Neg    (Negative)  


 


Urine Nitrite  Neg    (Negative)  


 


Urine Bilirubin  Neg    (Negative)  


 


Urine Urobilinogen  4.0    (<2.0)  mg/dL


 


Ur Leukocyte Esterase  Neg    (Negative)  


 


Urine WBC (Auto)  1.0    (0.0-6.0)  /HPF


 


Urine RBC (Auto)  1.0    (0.0-6.0)  /HPF


 


Urine Mucus  Few    /HPF


 


Urine Opiates Screen     


 


Urine Methadone Screen     


 


Ur Barbiturates Screen     


 


Ur Phencyclidine Scrn     


 


Ur Amphetamines Screen     


 


U Benzodiazepines Scrn     


 


Urine Cocaine Screen     


 


U Marijuana (THC) Screen     


 


Drugs of Abuse Note     


 


Plasma/Serum Alcohol     (0-0.07)  %














  06/12/22 06/12/22 06/12/22 Range/Units





  10:30 10:30 10:30 


 


WBC     (4.5-11.0)  K/mm3


 


RBC     (3.65-5.03)  M/mm3


 


Hgb     (11.8-15.2)  gm/dl


 


Hct     (35.5-45.6)  %


 


MCV     (84-94)  fl


 


MCH     (28-32)  pg


 


MCHC     (32-34)  %


 


RDW     (13.2-15.2)  %


 


Plt Count     (140-440)  K/mm3


 


Lymph % (Auto)     (13.4-35.0)  %


 


Mono % (Auto)     (0.0-7.3)  %


 


Eos % (Auto)     (0.0-4.3)  %


 


Baso % (Auto)     (0.0-1.8)  %


 


Lymph # (Auto)     (1.2-5.4)  K/mm3


 


Mono # (Auto)     (0.0-0.8)  K/mm3


 


Eos # (Auto)     (0.0-0.4)  K/mm3


 


Baso # (Auto)     (0.0-0.1)  K/mm3


 


Seg Neutrophils %     (40.0-70.0)  %


 


Seg Neutrophils #     (1.8-7.7)  K/mm3


 


PT  15.8 H    (12.2-14.9)  Sec.


 


INR  1.13    (0.87-1.13)  


 


APTT  29.1    (24.2-36.6)  Sec.


 


ABG pH     (7.350-7.450)  pH Units


 


ABG pCO2     mm Hg


 


ABG pO2     (80.0-90.0)  mm Hg


 


ABG HCO3     (20.0-26.0)  mmol/L


 


ABG O2 Saturation     (95.0-99.0)  %


 


ABG O2 Content     (0.0-44)  


 


ABG Base Excess     (-2.0-3.0)  mmol/L


 


ABG Hemoglobin     (14.0-18.0)  gm/dl


 


ABG Carboxyhemoglobin     (0.0-5.0)  %


 


ABG Methemoglobin     (0.0-1.5)  %


 


Oxyhemoglobin     (95.0-99.0)  %


 


FiO2     %


 


Sodium   139   (137-145)  mmol/L


 


Potassium   3.5 L   (3.6-5.0)  mmol/L


 


Chloride   106.3   ()  mmol/L


 


Carbon Dioxide   23   (22-30)  mmol/L


 


Anion Gap   13   mmol/L


 


BUN   6 L   (9-20)  mg/dL


 


Creatinine   0.5 L   (0.8-1.3)  mg/dL


 


Estimated GFR   > 60   ml/min


 


BUN/Creatinine Ratio   12   %


 


Glucose   146 H   ()  mg/dL


 


Calcium   8.2 L   (8.4-10.2)  mg/dL


 


Magnesium   2.50 H   (1.7-2.3)  mg/dL


 


Total Bilirubin   1.10   (0.1-1.2)  mg/dL


 


AST   26   (5-40)  units/L


 


ALT   26   (7-56)  units/L


 


Alkaline Phosphatase   105   ()  units/L


 


Ammonia     (25-60)  umol/L


 


Troponin T   < 0.010   (0.00-0.029)  ng/mL


 


NT-Pro-B Natriuret Pep    2101 H  (0-900)  pg/mL


 


Total Protein   7.2   (6.3-8.2)  g/dL


 


Albumin   3.6 L   (3.9-5)  g/dL


 


Albumin/Globulin Ratio   1.0   %


 


Urine Color     (Yellow)  


 


Urine Turbidity     (Clear)  


 


Urine pH     (5.0-7.0)  


 


Ur Specific Gravity     (1.003-1.030)  


 


Urine Protein     (Negative)  mg/dL


 


Urine Glucose (UA)     (Negative)  mg/dL


 


Urine Ketones     (Negative)  mg/dL


 


Urine Blood     (Negative)  


 


Urine Nitrite     (Negative)  


 


Urine Bilirubin     (Negative)  


 


Urine Urobilinogen     (<2.0)  mg/dL


 


Ur Leukocyte Esterase     (Negative)  


 


Urine WBC (Auto)     (0.0-6.0)  /HPF


 


Urine RBC (Auto)     (0.0-6.0)  /HPF


 


Urine Mucus     /HPF


 


Urine Opiates Screen     


 


Urine Methadone Screen     


 


Ur Barbiturates Screen     


 


Ur Phencyclidine Scrn     


 


Ur Amphetamines Screen     


 


U Benzodiazepines Scrn     


 


Urine Cocaine Screen     


 


U Marijuana (THC) Screen     


 


Drugs of Abuse Note     


 


Plasma/Serum Alcohol     (0-0.07)  %














  06/12/22 06/12/22 06/12/22 Range/Units





  10:30 10:30 11:04 


 


WBC     (4.5-11.0)  K/mm3


 


RBC     (3.65-5.03)  M/mm3


 


Hgb     (11.8-15.2)  gm/dl


 


Hct     (35.5-45.6)  %


 


MCV     (84-94)  fl


 


MCH     (28-32)  pg


 


MCHC     (32-34)  %


 


RDW     (13.2-15.2)  %


 


Plt Count     (140-440)  K/mm3


 


Lymph % (Auto)     (13.4-35.0)  %


 


Mono % (Auto)     (0.0-7.3)  %


 


Eos % (Auto)     (0.0-4.3)  %


 


Baso % (Auto)     (0.0-1.8)  %


 


Lymph # (Auto)     (1.2-5.4)  K/mm3


 


Mono # (Auto)     (0.0-0.8)  K/mm3


 


Eos # (Auto)     (0.0-0.4)  K/mm3


 


Baso # (Auto)     (0.0-0.1)  K/mm3


 


Seg Neutrophils %     (40.0-70.0)  %


 


Seg Neutrophils #     (1.8-7.7)  K/mm3


 


PT     (12.2-14.9)  Sec.


 


INR     (0.87-1.13)  


 


APTT     (24.2-36.6)  Sec.


 


ABG pH     (7.350-7.450)  pH Units


 


ABG pCO2     mm Hg


 


ABG pO2     (80.0-90.0)  mm Hg


 


ABG HCO3     (20.0-26.0)  mmol/L


 


ABG O2 Saturation     (95.0-99.0)  %


 


ABG O2 Content     (0.0-44)  


 


ABG Base Excess     (-2.0-3.0)  mmol/L


 


ABG Hemoglobin     (14.0-18.0)  gm/dl


 


ABG Carboxyhemoglobin     (0.0-5.0)  %


 


ABG Methemoglobin     (0.0-1.5)  %


 


Oxyhemoglobin     (95.0-99.0)  %


 


FiO2     %


 


Sodium     (137-145)  mmol/L


 


Potassium     (3.6-5.0)  mmol/L


 


Chloride     ()  mmol/L


 


Carbon Dioxide     (22-30)  mmol/L


 


Anion Gap     mmol/L


 


BUN     (9-20)  mg/dL


 


Creatinine     (0.8-1.3)  mg/dL


 


Estimated GFR     ml/min


 


BUN/Creatinine Ratio     %


 


Glucose     ()  mg/dL


 


Calcium     (8.4-10.2)  mg/dL


 


Magnesium     (1.7-2.3)  mg/dL


 


Total Bilirubin     (0.1-1.2)  mg/dL


 


AST     (5-40)  units/L


 


ALT     (7-56)  units/L


 


Alkaline Phosphatase     ()  units/L


 


Ammonia  67.0 H    (25-60)  umol/L


 


Troponin T     (0.00-0.029)  ng/mL


 


NT-Pro-B Natriuret Pep     (0-900)  pg/mL


 


Total Protein     (6.3-8.2)  g/dL


 


Albumin     (3.9-5)  g/dL


 


Albumin/Globulin Ratio     %


 


Urine Color     (Yellow)  


 


Urine Turbidity     (Clear)  


 


Urine pH     (5.0-7.0)  


 


Ur Specific Gravity     (1.003-1.030)  


 


Urine Protein     (Negative)  mg/dL


 


Urine Glucose (UA)     (Negative)  mg/dL


 


Urine Ketones     (Negative)  mg/dL


 


Urine Blood     (Negative)  


 


Urine Nitrite     (Negative)  


 


Urine Bilirubin     (Negative)  


 


Urine Urobilinogen     (<2.0)  mg/dL


 


Ur Leukocyte Esterase     (Negative)  


 


Urine WBC (Auto)     (0.0-6.0)  /HPF


 


Urine RBC (Auto)     (0.0-6.0)  /HPF


 


Urine Mucus     /HPF


 


Urine Opiates Screen    Negative  


 


Urine Methadone Screen    Negative  


 


Ur Barbiturates Screen    Negative  


 


Ur Phencyclidine Scrn    Negative  


 


Ur Amphetamines Screen    Negative  


 


U Benzodiazepines Scrn    Negative  


 


Urine Cocaine Screen    Positive  


 


U Marijuana (THC) Screen    Negative  


 


Drugs of Abuse Note    Disclamer  


 


Plasma/Serum Alcohol   < 0.01   (0-0.07)  %














  06/12/22 06/12/22 Range/Units





  11:50 12:32 


 


WBC    (4.5-11.0)  K/mm3


 


RBC    (3.65-5.03)  M/mm3


 


Hgb    (11.8-15.2)  gm/dl


 


Hct    (35.5-45.6)  %


 


MCV    (84-94)  fl


 


MCH    (28-32)  pg


 


MCHC    (32-34)  %


 


RDW    (13.2-15.2)  %


 


Plt Count    (140-440)  K/mm3


 


Lymph % (Auto)    (13.4-35.0)  %


 


Mono % (Auto)    (0.0-7.3)  %


 


Eos % (Auto)    (0.0-4.3)  %


 


Baso % (Auto)    (0.0-1.8)  %


 


Lymph # (Auto)    (1.2-5.4)  K/mm3


 


Mono # (Auto)    (0.0-0.8)  K/mm3


 


Eos # (Auto)    (0.0-0.4)  K/mm3


 


Baso # (Auto)    (0.0-0.1)  K/mm3


 


Seg Neutrophils %    (40.0-70.0)  %


 


Seg Neutrophils #    (1.8-7.7)  K/mm3


 


PT    (12.2-14.9)  Sec.


 


INR    (0.87-1.13)  


 


APTT    (24.2-36.6)  Sec.


 


ABG pH  7.394   (7.350-7.450)  pH Units


 


ABG pCO2  42.1   mm Hg


 


ABG pO2  62.6 L   (80.0-90.0)  mm Hg


 


ABG HCO3  25.2   (20.0-26.0)  mmol/L


 


ABG O2 Saturation  93.4 L   (95.0-99.0)  %


 


ABG O2 Content  17.7   (0.0-44)  


 


ABG Base Excess  0.2   (-2.0-3.0)  mmol/L


 


ABG Hemoglobin  13.9 L   (14.0-18.0)  gm/dl


 


ABG Carboxyhemoglobin  2.9   (0.0-5.0)  %


 


ABG Methemoglobin  0.6   (0.0-1.5)  %


 


Oxyhemoglobin  90.1 L   (95.0-99.0)  %


 


FiO2  21   %


 


Sodium    (137-145)  mmol/L


 


Potassium    (3.6-5.0)  mmol/L


 


Chloride    ()  mmol/L


 


Carbon Dioxide    (22-30)  mmol/L


 


Anion Gap    mmol/L


 


BUN    (9-20)  mg/dL


 


Creatinine    (0.8-1.3)  mg/dL


 


Estimated GFR    ml/min


 


BUN/Creatinine Ratio    %


 


Glucose    ()  mg/dL


 


Calcium    (8.4-10.2)  mg/dL


 


Magnesium    (1.7-2.3)  mg/dL


 


Total Bilirubin    (0.1-1.2)  mg/dL


 


AST    (5-40)  units/L


 


ALT    (7-56)  units/L


 


Alkaline Phosphatase    ()  units/L


 


Ammonia    (25-60)  umol/L


 


Troponin T   < 0.010  (0.00-0.029)  ng/mL


 


NT-Pro-B Natriuret Pep    (0-900)  pg/mL


 


Total Protein    (6.3-8.2)  g/dL


 


Albumin    (3.9-5)  g/dL


 


Albumin/Globulin Ratio    %


 


Urine Color    (Yellow)  


 


Urine Turbidity    (Clear)  


 


Urine pH    (5.0-7.0)  


 


Ur Specific Gravity    (1.003-1.030)  


 


Urine Protein    (Negative)  mg/dL


 


Urine Glucose (UA)    (Negative)  mg/dL


 


Urine Ketones    (Negative)  mg/dL


 


Urine Blood    (Negative)  


 


Urine Nitrite    (Negative)  


 


Urine Bilirubin    (Negative)  


 


Urine Urobilinogen    (<2.0)  mg/dL


 


Ur Leukocyte Esterase    (Negative)  


 


Urine WBC (Auto)    (0.0-6.0)  /HPF


 


Urine RBC (Auto)    (0.0-6.0)  /HPF


 


Urine Mucus    /HPF


 


Urine Opiates Screen    


 


Urine Methadone Screen    


 


Ur Barbiturates Screen    


 


Ur Phencyclidine Scrn    


 


Ur Amphetamines Screen    


 


U Benzodiazepines Scrn    


 


Urine Cocaine Screen    


 


U Marijuana (THC) Screen    


 


Drugs of Abuse Note    


 


Plasma/Serum Alcohol    (0-0.07)  %














- EKG Data


-: EKG Interpreted by Me (pvc's)


EKG shows normal: sinus rhythm, ST-T waves (no stemi)


Rate: tachycardia (103)





- EKG Data


When compared to previous EKG there are: no significant change





- Radiology Data


Radiology results: report reviewed





CHEST 1 VIEW  


 


 INDICATION / CLINICAL INFORMATION: Dyspnea.  


 


 COMPARISON: None available.  


 


 FINDINGS:  


 


 SUPPORT DEVICES: None.  


 HEART / MEDIASTINUM: Enlarged   


 LUNGS / PLEURA: Patchy perihilar and bibasilar airspace opacities. No focal 

consolidation or 


significant effusion. No pneumothorax.   


 


 ADDITIONAL FINDINGS: No significant additional findings.  


 


 IMPRESSION:  


 1.  Patchy perihilar and bibasilar airspace opacities, suspicious for multifo

royal pneumonia. Mild 


pulmonary edema is additional consideration.  


 2.  Cardiomegaly.  


 








- Medical Decision Making





52-year-old male presents to the hospital with wheezing and shortness of breath 

with unclear baseline mental status.  Lab work including ABG does not reveal any

 acute abnormality to suggest an acute cause of encephalopathy.  Patient does 

not have any acid-base disturbance and only has mild hypoxia with a room air PO2

 of 62.  UDS positive for cocaine.  Patient has elevated BNP, wheezing on exam, 

possible infiltrates versus edema on chest x-ray.  CT chest confirms multifocal 

focal pneumonia.  CT head performed shows artifact without obvious abnormality. 

 Patient has persistent wheezing despite ED treatment with bronchodilators, 

steroids, magnesium, and Lasix.  1 dose of p.o. potassium ordered.  Patient 

treated with azithromycin, Rocephin, and COVID test ordered.


Critical Care Time: No


Critical care attestation.: 


If time is entered above; I have spent that time in minutes in the direct care 

of this critically ill patient, excluding procedure time.








ED Disposition


Clinical Impression: 


 Pneumonia, Wheezing, Cocaine abuse





Disposition: 09 ADMITTED AS INPATIENT


Is pt being admited?: Yes


Condition: Stable


Instructions:  Bacterial Pneumonia (ED)


Time of Disposition: 14:09

## 2022-06-12 NOTE — ELECTROCARDIOGRAPH REPORT
[facility]

                                       

Test Date:    2022               Test Time:    10:31:46

Pat Name:     MONIQUE GAR             Department:   

Patient ID:   [id number]          Room:         [facility]

Gender:       M                        Technician:   DANIELLE

:          1970               Requested By: ANIA OSBORN

Order Number: [id number]              Reading MD:   Azra Lerma

                                 Measurements

Intervals                              Axis          

Rate:         103                      P:            30

MI:           157                      QRS:          -77

QRSD:         85                       T:            61

QT:           369                                    

QTc:          485                                    

                           Interpretive Statements

Sinus tachycardia

Multiform ventricular premature complexes

Probable left atrial enlargement

Left ventricular hypertrophy

Inferior infarct, old

Anterior infarct, old

No previous ECG available for comparison

Electronically Signed On 2022 21:47:32 EDT by Azra Lerma

## 2022-06-13 LAB
BASOPHILS # (AUTO): 0 K/MM3 (ref 0–0.1)
BASOPHILS NFR BLD AUTO: 0.2 % (ref 0–1.8)
BUN SERPL-MCNC: 8 MG/DL (ref 9–20)
BUN/CREAT SERPL: 16 %
CALCIUM SERPL-MCNC: 8.6 MG/DL (ref 8.4–10.2)
EOSINOPHIL # BLD AUTO: 0 K/MM3 (ref 0–0.4)
EOSINOPHIL NFR BLD AUTO: 0 % (ref 0–4.3)
HCT VFR BLD CALC: 41.6 % (ref 35.5–45.6)
HEMOLYSIS INDEX: 1
HGB BLD-MCNC: 13.5 GM/DL (ref 11.8–15.2)
LYMPHOCYTES # BLD AUTO: 0.6 K/MM3 (ref 1.2–5.4)
LYMPHOCYTES NFR BLD AUTO: 7.4 % (ref 13.4–35)
MCHC RBC AUTO-ENTMCNC: 33 % (ref 32–34)
MCV RBC AUTO: 89 FL (ref 84–94)
MONOCYTES # (AUTO): 0.2 K/MM3 (ref 0–0.8)
MONOCYTES % (AUTO): 2.6 % (ref 0–7.3)
PLATELET # BLD: 132 K/MM3 (ref 140–440)
RBC # BLD AUTO: 4.67 M/MM3 (ref 3.65–5.03)

## 2022-06-13 RX ADMIN — OXYCODONE HYDROCHLORIDE AND ACETAMINOPHEN SCH MG: 500 TABLET ORAL at 02:45

## 2022-06-13 RX ADMIN — Medication SCH MG: at 22:28

## 2022-06-13 RX ADMIN — OXYCODONE HYDROCHLORIDE AND ACETAMINOPHEN SCH MG: 500 TABLET ORAL at 22:28

## 2022-06-13 RX ADMIN — HEPARIN SODIUM SCH UNIT: 5000 INJECTION, SOLUTION INTRAVENOUS; SUBCUTANEOUS at 02:00

## 2022-06-13 RX ADMIN — HEPARIN SODIUM SCH UNIT: 5000 INJECTION, SOLUTION INTRAVENOUS; SUBCUTANEOUS at 11:33

## 2022-06-13 RX ADMIN — Medication SCH MG: at 11:32

## 2022-06-13 RX ADMIN — CEFTRIAXONE SODIUM SCH MLS/HR: 2 INJECTION, POWDER, FOR SOLUTION INTRAMUSCULAR; INTRAVENOUS at 15:00

## 2022-06-13 RX ADMIN — Medication SCH ML: at 22:27

## 2022-06-13 RX ADMIN — AZITHROMYCIN SCH MG: 250 TABLET, FILM COATED ORAL at 11:32

## 2022-06-13 RX ADMIN — Medication SCH MG: at 02:45

## 2022-06-13 RX ADMIN — Medication SCH UNIT: at 11:32

## 2022-06-13 RX ADMIN — METHYLPREDNISOLONE SODIUM SUCCINATE SCH MG: 40 INJECTION, POWDER, FOR SOLUTION INTRAMUSCULAR; INTRAVENOUS at 18:37

## 2022-06-13 RX ADMIN — Medication SCH ML: at 02:45

## 2022-06-13 RX ADMIN — HEPARIN SODIUM SCH UNIT: 5000 INJECTION, SOLUTION INTRAVENOUS; SUBCUTANEOUS at 22:28

## 2022-06-13 RX ADMIN — METHYLPREDNISOLONE SODIUM SUCCINATE SCH MG: 40 INJECTION, POWDER, FOR SOLUTION INTRAMUSCULAR; INTRAVENOUS at 11:32

## 2022-06-13 RX ADMIN — FOLIC ACID SCH MG: 1 TABLET ORAL at 11:34

## 2022-06-13 RX ADMIN — METHYLPREDNISOLONE SODIUM SUCCINATE SCH MG: 40 INJECTION, POWDER, FOR SOLUTION INTRAMUSCULAR; INTRAVENOUS at 02:45

## 2022-06-13 RX ADMIN — Medication SCH ML: at 11:34

## 2022-06-13 RX ADMIN — OXYCODONE HYDROCHLORIDE AND ACETAMINOPHEN SCH MG: 500 TABLET ORAL at 11:32

## 2022-06-13 RX ADMIN — MULTIVITAMIN TABLET SCH EACH: TABLET at 11:33

## 2022-06-13 NOTE — PROGRESS NOTE
Assessment and Plan


Assessment and plan: 





#Pneumonia


Chest x-ray, supplemental oxygen, pulse oximetry, nebulizer therapy, IV 

antibiotic therapy, blood culture





#Acute hypoxemic respiratory failure-resolved


-Patient currently saturating greater then 90% on room air


-We will continue treatment for problem above





#Suspected 2019 novel coronavirus infection


-COVID 19 PCR ordered


-We will continue with empiric treatment until final PCR result





#Elevated BNP


-NT proBNP 2101


-Echocardiogram shows normal ejection fraction


-Likely secondary to cocaine use; patient has no signs or symptoms of CHF 

exacerbation





#Elevated D-dimer


-CT chest w/ contrast shows bilateral patchy infiltrates


-CT angiogram of chest ordered to rule out PE





#Cocaine dependence


-Patient counseled regarding absence from cocaine.  Patient instructed to seek 

outpatient drug dependence counseling at discharge.





#Alcohol dependence


-CIWA protocol, thiamine, folic acid, multivitamin daily, banana bag.  

Supportive care.





#Hypertension


-Patient denies history of hypertension and currently takes no medications


-Monitor blood pressure every shift


-Continue as needed medication we will start medications pending


-Full medical reconciliation





#Advance care planning


-Disease education data, care plan discussed, diagnosis discussed, prognosis 

discussed, patient is full code.  Patient knowledges understanding agreement 

with care plan, +30 minutes.





History


Interval history: 





No acute events overnight.  Patient was in and out of sleep during interview.  

He refused to participate.





Hospitalist Physical





- Physical exam


Narrative exam: 





GENERAL: Well-developed well-nourished. In no acute distress.


HEENT: Normocephalic. Atraumatic. 


NECK: Supple.  


CHEST/LUNGS: CTAB on room air


HEART/CARDIOVASCULAR: RRR. No murmur, rubs or gallops appreciated.


ABDOMEN: +BS. NT/ND.


SKIN: No rashes noted.


NEURO: Unable to assess due to patient unwillingness to participate in interview


MUSCULOSKELETAL: No joint effusion 


EXTREMITIES: No cyanosis, cubbing or edema.


PSYCH: Alert and oriented x3.





- Constitutional


Vitals: 


                                        











Temp Pulse Resp BP Pulse Ox


 


 98.4 F   105 H  20   140/94   97 


 


 06/13/22 05:25  06/13/22 06:00  06/13/22 05:25  06/13/22 06:00  06/13/22 08:56











General appearance: Present: mild distress





HEART Score





- HEART Score


Troponin: 


                                        











Troponin T  0.013 ng/mL (0.00-0.029)   06/12/22  14:34    














Results





- Labs


CBC & Chem 7: 


                                 06/13/22 05:33





                                 06/13/22 05:33


Labs: 


                             Laboratory Last Values











WBC  8.1 K/mm3 (4.5-11.0)   06/13/22  05:33    


 


RBC  4.67 M/mm3 (3.65-5.03)   06/13/22  05:33    


 


Hgb  13.5 gm/dl (11.8-15.2)   06/13/22  05:33    


 


Hct  41.6 % (35.5-45.6)   06/13/22  05:33    


 


MCV  89 fl (84-94)   06/13/22  05:33    


 


MCH  29 pg (28-32)   06/13/22  05:33    


 


MCHC  33 % (32-34)   06/13/22  05:33    


 


RDW  19.5 % (13.2-15.2)  H  06/13/22  05:33    


 


Plt Count  132 K/mm3 (140-440)  L  06/13/22  05:33    


 


Lymph % (Auto)  7.4 % (13.4-35.0)  L  06/13/22  05:33    


 


Mono % (Auto)  2.6 % (0.0-7.3)   06/13/22  05:33    


 


Eos % (Auto)  0.0 % (0.0-4.3)   06/13/22  05:33    


 


Baso % (Auto)  0.2 % (0.0-1.8)   06/13/22  05:33    


 


Lymph # (Auto)  0.6 K/mm3 (1.2-5.4)  L  06/13/22  05:33    


 


Mono # (Auto)  0.2 K/mm3 (0.0-0.8)   06/13/22  05:33    


 


Eos # (Auto)  0.0 K/mm3 (0.0-0.4)   06/13/22  05:33    


 


Baso # (Auto)  0.0 K/mm3 (0.0-0.1)   06/13/22  05:33    


 


Seg Neutrophils %  89.8 % (40.0-70.0)  H  06/13/22  05:33    


 


Seg Neutrophils #  7.3 K/mm3 (1.8-7.7)   06/13/22  05:33    


 


PT  15.8 Sec. (12.2-14.9)  H  06/12/22  10:30    


 


INR  1.13  (0.87-1.13)   06/12/22  10:30    


 


APTT  29.1 Sec. (24.2-36.6)   06/12/22  10:30    


 


D-Dimer  690.26 ng/mlDDU (0-234)  H  06/12/22  14:34    


 


ABG pH  7.394 pH Units (7.350-7.450)   06/12/22  11:50    


 


ABG pCO2  42.1 mm Hg  06/12/22  11:50    


 


ABG pO2  62.6 mm Hg (80.0-90.0)  L  06/12/22  11:50    


 


ABG HCO3  25.2 mmol/L (20.0-26.0)   06/12/22  11:50    


 


ABG O2 Saturation  93.4 % (95.0-99.0)  L  06/12/22  11:50    


 


ABG O2 Content  17.7  (0.0-44)   06/12/22  11:50    


 


ABG Base Excess  0.2 mmol/L (-2.0-3.0)   06/12/22  11:50    


 


ABG Hemoglobin  13.9 gm/dl (14.0-18.0)  L  06/12/22  11:50    


 


ABG Carboxyhemoglobin  2.9 % (0.0-5.0)   06/12/22  11:50    


 


ABG Methemoglobin  0.6 % (0.0-1.5)   06/12/22  11:50    


 


Oxyhemoglobin  90.1 % (95.0-99.0)  L  06/12/22  11:50    


 


FiO2  21 %  06/12/22  11:50    


 


Sodium  138 mmol/L (137-145)   06/13/22  05:33    


 


Potassium  4.0 mmol/L (3.6-5.0)   06/13/22  05:33    


 


Chloride  104.9 mmol/L ()   06/13/22  05:33    


 


Carbon Dioxide  25 mmol/L (22-30)   06/13/22  05:33    


 


Anion Gap  12 mmol/L  06/13/22  05:33    


 


BUN  8 mg/dL (9-20)  L  06/13/22  05:33    


 


Creatinine  0.5 mg/dL (0.8-1.3)  L  06/13/22  05:33    


 


Estimated GFR  > 60 ml/min  06/13/22  05:33    


 


BUN/Creatinine Ratio  16 %  06/13/22  05:33    


 


Glucose  291 mg/dL ()  H  06/13/22  05:33    


 


Calcium  8.6 mg/dL (8.4-10.2)   06/13/22  05:33    


 


Magnesium  2.50 mg/dL (1.7-2.3)  H  06/12/22  10:30    


 


Ferritin  165.6 ng/mL (30.0-300.0)   06/12/22  14:34    


 


Total Bilirubin  1.10 mg/dL (0.1-1.2)   06/12/22  10:30    


 


AST  26 units/L (5-40)   06/12/22  10:30    


 


ALT  26 units/L (7-56)   06/12/22  10:30    


 


Alkaline Phosphatase  105 units/L ()   06/12/22  10:30    


 


Ammonia  67.0 umol/L (25-60)  H  06/12/22  10:30    


 


Lactate Dehydrogenase  386 units/L ()  H  06/12/22  14:34    


 


Troponin T  0.013 ng/mL (0.00-0.029)   06/12/22  14:34    


 


C-Reactive Protein  5.50 mg/dL (0.00-1.30)  H  06/12/22  14:34    


 


NT-Pro-B Natriuret Pep  2101 pg/mL (0-900)  H  06/12/22  10:30    


 


Total Protein  7.2 g/dL (6.3-8.2)   06/12/22  10:30    


 


Albumin  3.6 g/dL (3.9-5)  L  06/12/22  10:30    


 


Albumin/Globulin Ratio  1.0 %  06/12/22  10:30    


 


Urine Color  Yellow  (Yellow)   06/12/22  09:33    


 


Urine Turbidity  Clear  (Clear)   06/12/22  09:33    


 


Urine pH  6.0  (5.0-7.0)   06/12/22  09:33    


 


Ur Specific Gravity  1.016  (1.003-1.030)   06/12/22  09:33    


 


Urine Protein  30 mg/dl mg/dL (Negative)   06/12/22  09:33    


 


Urine Glucose (UA)  50 mg/dL (Negative)   06/12/22  09:33    


 


Urine Ketones  Neg mg/dL (Negative)   06/12/22  09:33    


 


Urine Blood  Neg  (Negative)   06/12/22  09:33    


 


Urine Nitrite  Neg  (Negative)   06/12/22  09:33    


 


Urine Bilirubin  Neg  (Negative)   06/12/22  09:33    


 


Urine Urobilinogen  4.0 mg/dL (<2.0)   06/12/22  09:33    


 


Ur Leukocyte Esterase  Neg  (Negative)   06/12/22  09:33    


 


Urine WBC (Auto)  1.0 /HPF (0.0-6.0)   06/12/22  09:33    


 


Urine RBC (Auto)  1.0 /HPF (0.0-6.0)   06/12/22  09:33    


 


Urine Mucus  Few /HPF  06/12/22  09:33    


 


Urine Opiates Screen  Negative   06/12/22  11:04    


 


Urine Methadone Screen  Negative   06/12/22  11:04    


 


Ur Barbiturates Screen  Negative   06/12/22  11:04    


 


Ur Phencyclidine Scrn  Negative   06/12/22  11:04    


 


Ur Amphetamines Screen  Negative   06/12/22  11:04    


 


U Benzodiazepines Scrn  Negative   06/12/22  11:04    


 


Urine Cocaine Screen  Positive   06/12/22  11:04    


 


U Marijuana (THC) Screen  Negative   06/12/22  11:04    


 


Drugs of Abuse Note  Disclamer   06/12/22  11:04    


 


Plasma/Serum Alcohol  < 0.01 % (0-0.07)   06/12/22  10:30    











Microbiology: 


Microbiology





06/12/22 10:30   Peripheral/Venous   Blood Culture - Preliminary


                            Culture in Progress


06/12/22 10:30   Peripheral/Venous   Blood Culture - Preliminary


                            Culture in Progress








[place]/IV: 


                                        





Voiding Method                   Toilet











Active Medications





- Current Medications


Current Medications: 














Generic Name Dose Route Start Last Admin





  Trade Name Freq  PRN Reason Stop Dose Admin


 


Acetaminophen  650 mg  06/12/22 14:13 





  Acetaminophen 325 Mg Tab  PO  





  Q4H PRN  





  Pain MILD(1-3)/Fever >100.5/HA  


 


Albuterol  2.5 mg  06/12/22 14:13 





  Albuterol 2.5 Mg/3 Ml Nebu  IH  





  Q4HRT PRN  





  Shortness Of Breath  


 


Ascorbic Acid  500 mg  06/12/22 22:00  06/13/22 02:45





  Ascorbic Acid 500 Mg Tab  PO   500 mg





  BID DELLA   Administration


 


Azithromycin  500 mg  06/13/22 10:00 





  Azithromycin 250 Mg Tab  PO  06/16/22 10:01 





  QDAY [facility]  





  Protocol  


 


Cholecalciferol  1,000 unit  06/13/22 10:00 





  Cholecalciferol (Vit D3) 1000 Unit (25 Mcg) Tab  PO  





  QDAY DELLA  


 


Folic Acid  1 mg  06/13/22 10:00 





  Folic Acid 1 Mg Tab  PO  





  QDAY [facility]  


 


Heparin Sodium (Porcine)  5,000 unit  06/12/22 22:00  06/13/22 02:00





  Heparin 5,000 Unit/1 Ml Vial  SUB-Q   5,000 unit





  Q12HR [facility]   Administration


 


Hydromorphone HCl  0.25 mg  06/12/22 14:13 





  Hydromorphone 0.5 Mg/0.5 Ml Inj  IV  





  Q23H PRN  





  Pain, Moderate (4-6)  


 


Ceftriaxone Sodium  2 gm in 100 mls @ 200 mls/hr  06/13/22 14:00 





  Rocephin/Ns 2 Gm/100 Ml  IV  06/16/22 18:59 





  Q24H [facility]  





  Protocol  


 


Lorazepam  2 mg  06/12/22 17:01  06/13/22 02:46





  Lorazepam 2 Mg/Ml Vial  IV   2 mg





  Q1HR PRN   Administration





  CIWA-Ar 8-15  


 


Lorazepam  4 mg  06/12/22 17:01 





  Lorazepam 2 Mg/Ml Vial  IV  





  Q1HR PRN  





  CIWA-Ar 16-25  


 


Methylprednisolone Sodium Succinate  40 mg  06/12/22 18:00  06/13/22 02:45





  Methylprednisolone Sod Succinate 40 Mg/1 Ml Inj  IV   40 mg





  Q8H [facility]   Administration


 


Multivitamins  1 each  06/13/22 10:00 





  Multivitamins ,Therapeutic Tab  PO  





  DAILY [facility]  


 


Ondansetron HCl  4 mg  06/12/22 14:13 





  Ondansetron 4 Mg/2 Ml Inj  IV  





  Q8H PRN  





  Nausea And Vomiting  


 


Oxycodone/Acetaminophen  1 tab  06/12/22 14:13 





  Oxycodone /Acetaminophen 5-325mg Tab  PO  





  Q16H PRN  





  Pain, Moderate (4-6)  


 


Sodium Chloride  10 ml  06/12/22 22:00  06/13/22 02:45





  Sodium Chloride 0.9% 10 Ml Flush Syringe  IV   10 ml





  BID DELLA   Administration


 


Sodium Chloride  10 ml  06/12/22 14:13 





  Sodium Chloride 0.9% 10 Ml Flush Syringe  IV  





  PRN PRN  





  LINE FLUSH  


 


Thiamine HCl  100 mg  06/13/22 10:00 





  Thiamine 100 Mg Tab  PO  





  QDAY DELLA  


 


Zinc Sulfate  220 mg  06/12/22 22:00  06/13/22 02:45





  Zinc Sulfate 220 Mg Cap  PO   220 mg





  BID DELLA   Administration

## 2022-06-13 NOTE — CAT SCAN REPORT
CT CHEST WITH CONTRAST



INDICATION / CLINICAL INFORMATION: S.O.B., abnormal breath sounds and CXR.



TECHNIQUE: Axial CT images were obtained through the chest after IV contrast. All CT scans at this [facility] are performed using CT dose reduction for ALARA by means of automated exposure control. 



COMPARISON: Chest radiograph earlier same day



FINDINGS:



HEART: Mildly enlarged

CORONARY ARTERY CALCIFICATION: Multivessel

THORACIC AORTA: No significant abnormality. 

MEDIASTINUM / MITCHEL: No significant abnormality.

PLEURA: No pleural effusion. No pneumothorax.

LUNGS: Evaluation is severely degraded secondary to patient respiratory motion. There are patchy airs
pace opacities throughout bilateral lungs, most notably involving the right upper and left lower lobe
s.



ADDITIONAL FINDINGS: None.



UPPER ABDOMEN: Trace amount of perihepatic ascites.



SKELETAL SYSTEM: No significant abnormality.



IMPRESSION:

1.  Severely motion degraded examination secondary to patient respiratory motion artifact.

2.  Patchy airspace opacities throughout the lungs, most notably within the right upper and left lowe
r lobes, most suspicious for multilobar pneumonia.

3.  Other chronic findings as above.



Signer Name: Hugo Ng MD 

Signed: 6/12/2022 2:01 PM

Workstation Name: [id number]
CT HEAD WITHOUT CONTRAST



INDICATION / CLINICAL INFORMATION: Decreased Mental Status.



TECHNIQUE: All CT scans at this location are performed using CT dose reduction for ALARA by means of 
automated exposure control. Severely motion degraded examination



COMPARISON: None available.



FINDINGS:

HEMORRHAGE: None.

ACUTE INFARCTION: No Significant Abnormality

MASS/MASS EFFECT: No Significant Abnormality



CEREBRAL PARENCHYMA: No acute focal attenuation abnormality.

VENTRICULAR SYSTEM: Normal in size and morphology for the patient's age.



ORBITS: Normal as visualized.

SKULL: No significant abnormality.

PARANASAL SINUSES / MASTOID AIR CELLS: Mild mucosal thickening of ethmoid sinuses..



ADDITIONAL FINDINGS: None.



IMPRESSION:

1.  Severe motion degradation significantly limits evaluation.

2.  Allowing for limitation, no definite acute intracranial abnormality. If there is persistent clini
royal concern for intracranial abnormality, follow-up CT should be considered when patient is able to b
e more cooperative.

3.  Mild ethmoid sinus disease.



Signer Name: Hugo Ng MD 

Signed: 6/12/2022 1:58 PM

Workstation Name: [id number]
CTA CHEST WITH CONTRAST



INDICATION / CLINICAL INFORMATION: rule out PE.



TECHNIQUE: Axial CT images were obtained through the chest after injection of 100 cc Omnipaque 350 IV
 contrast. 3 plane MIP and/or 3D reconstructions were produced. All CT scans at this location are per
formed using CT dose reduction for ALARA by means of automated exposure control. 



COMPARISON: None available.



FINDINGS:

VASCULAR FINDINGS:

PULMONARY ARTERY: Pulmonary artery is normal in size. No filling defects are present compatible with 
pulmonary artery embolus..

THORACIC AORTA: No significant abnormality. 

CORONARY ARTERY CALCIFICATION: Absent -- None.



NONVASCULAR FINDINGS:

LOWER NECK: Soft tissues and musculature of the lower neck demonstrate no significant abnormality. Th
e thyroid demonstrates no significant abnormality.

HEART: No significant abnormality.

MEDIASTINUM / MITCHEL: No significant abnormality.

ESOPHAGUS: Mild wall thickening of the mid to lower esophagus is demonstrated, nonspecific finding.

LYMPH NODES: Minimally enlarged right hilar and subcarinal lymph nodes.

LUNGS: Pleural/subpleural patulous regions of groundglass attenuation primarily within the right uppe
r lobe.

PLEURA: No pleural effusion. No pneumothorax.

THORACIC SOFT TISSUES: No significant abnormality of the chest wall or upper thoracic musculature.

BONES: No significant skeletal abnormalities.

ADDITIONAL CHEST FINDINGS: None.

UPPER ABDOMEN: Small amount of fat stranding adjacent to pancreatic tail along anterior left pararena
l fascia.





IMPRESSION:

1. No CT evidence for pulmonary embolism. 

2. Patulous regions of lung attenuation primarily within the right upper lobe. Reflect infectious or 
inflammatory process.

3. Other findings as detailed.



Signer Name: Felix Daniel II, MD 

Signed: 6/13/2022 11:07 PM

Workstation Name: [id number]
Statement Selected

## 2022-06-14 RX ADMIN — MULTIVITAMIN TABLET SCH EACH: TABLET at 09:41

## 2022-06-14 RX ADMIN — HEPARIN SODIUM SCH UNIT: 5000 INJECTION, SOLUTION INTRAVENOUS; SUBCUTANEOUS at 09:41

## 2022-06-14 RX ADMIN — METHYLPREDNISOLONE SODIUM SUCCINATE SCH: 40 INJECTION, POWDER, FOR SOLUTION INTRAMUSCULAR; INTRAVENOUS at 17:23

## 2022-06-14 RX ADMIN — AZITHROMYCIN SCH MG: 250 TABLET, FILM COATED ORAL at 09:40

## 2022-06-14 RX ADMIN — OXYCODONE HYDROCHLORIDE AND ACETAMINOPHEN SCH MG: 500 TABLET ORAL at 09:40

## 2022-06-14 RX ADMIN — HEPARIN SODIUM SCH UNIT: 5000 INJECTION, SOLUTION INTRAVENOUS; SUBCUTANEOUS at 21:00

## 2022-06-14 RX ADMIN — Medication SCH ML: at 09:41

## 2022-06-14 RX ADMIN — Medication SCH UNIT: at 09:41

## 2022-06-14 RX ADMIN — Medication SCH MG: at 09:40

## 2022-06-14 RX ADMIN — METHYLPREDNISOLONE SODIUM SUCCINATE SCH MG: 40 INJECTION, POWDER, FOR SOLUTION INTRAMUSCULAR; INTRAVENOUS at 20:53

## 2022-06-14 RX ADMIN — METHYLPREDNISOLONE SODIUM SUCCINATE SCH MG: 40 INJECTION, POWDER, FOR SOLUTION INTRAMUSCULAR; INTRAVENOUS at 02:46

## 2022-06-14 RX ADMIN — Medication SCH ML: at 21:00

## 2022-06-14 RX ADMIN — Medication SCH MG: at 21:00

## 2022-06-14 RX ADMIN — FOLIC ACID SCH MG: 1 TABLET ORAL at 09:41

## 2022-06-14 RX ADMIN — CEFTRIAXONE SODIUM SCH MLS/HR: 2 INJECTION, POWDER, FOR SOLUTION INTRAMUSCULAR; INTRAVENOUS at 15:00

## 2022-06-14 RX ADMIN — LOSARTAN POTASSIUM SCH MG: 50 TABLET, FILM COATED ORAL at 09:40

## 2022-06-14 RX ADMIN — OXYCODONE HYDROCHLORIDE AND ACETAMINOPHEN SCH MG: 500 TABLET ORAL at 21:00

## 2022-06-14 RX ADMIN — NIFEDIPINE SCH MG: 60 TABLET, EXTENDED RELEASE ORAL at 09:40

## 2022-06-14 RX ADMIN — Medication SCH MG: at 09:42

## 2022-06-14 NOTE — PROGRESS NOTE
Assessment and Plan


Assessment and plan: 





#Pneumonia


-Chest x-ray, supplemental oxygen, pulse oximetry, nebulizer therapy, IV 

antibiotic therapy, blood culture





#Acute hypoxic respiratory failureworsened


- etiology: Secondary to presumed pneumonia


- baseline oxygen requirements: Room air


- supplemental oxygen: 2 L nasal cannula


- Continue protocol: continue pulse oximetry, wean oxygen as tolerated, ordered 

incentive spirometry and educated patient on how to use it and its importance. 


- continue to monitor





#Suspected 2019 novel coronavirus infectionruled out


-COVID 19 PCR ordered


-Discontinuing empiric treatment





#Elevated BNP


-NT proBNP 2101


-Echocardiogram shows normal ejection fraction


-Likely secondary to cocaine use; patient has no signs or symptoms of CHF 

exacerbation





#Elevated D-dimer


-CT chest w/ contrast shows bilateral patchy infiltrates


-Unremarkable CT angio chestruling out pulmonary embolism





#Hypertension


- home medications: None


- current medications: Nifedipine 60 mg daily and losartan 50 mg a


- SBP goal <160 and DBP goal <90 while inpatient


- continue to monitor





#Mild protein caloric malnutrition


 Albumin 3.6


 Starting dietary supplements





#Polysubstance dependence


-Patient engages in the following substances: Cocaine


-Counseled patient about the importance of cessation of substance abuse. Offered

resources to help with quitting. Patient expresses understanding. 


-Time: +15 mins





#Alcohol dependence


- Counseled patient on the importance of ETOH cessation. Assess patient's 

current ETOH consumption. Assisted with trying to arrange resources for patient 

to adequately work towards ETOH cessation. Patient expresses understanding. 


-CIWA protocol, thiamine, folic acid, multivitamin daily, banana bag.  

Supportive care.


-Time: +15 mins





#Advance care planning


-Disease education data, care plan discussed, diagnosis discussed, prognosis 

discussed, patient is full code.  Patient knowledges understanding agreement 

with care plan, +30 minutes.


Disposition Plan: Continue medical management


Total Time Spent with Patient (Minutes): 45 min 





History


Interval history: 





No acute events overnight. 





Hospitalist Physical





- Constitutional


Vitals: 


                                        











Temp Pulse Resp BP Pulse Ox


 


 97.7 F   105 H  20   194/125   97 


 


 06/14/22 11:24  06/14/22 11:24  06/14/22 11:24  06/14/22 11:24  06/14/22 11:24











General appearance: Present: no acute distress, well-nourished





- EENT


Eyes: Present: PERRL, EOM intact


ENT: hearing intact, clear oral mucosa, dentition normal





- Neck


Neck: Present: supple, normal ROM





- Respiratory


Respiratory effort: normal


Respiratory: bilateral: diminished





- Cardiovascular


Rhythm: regular


Heart Sounds: Present: S1 & S2





- Extremities


Extremities: no ischemia, pulses intact, pulses symmetrical, No edema, normal 

temperature, normal color


Peripheral Pulses: within normal limits





- Abdominal


General gastrointestinal: soft, non-tender, non-distended, normal bowel sounds





- Integumentary


Integumentary: Present: clear, warm, dry





- Psychiatric


Psychiatric: appropriate mood/affect, cooperative





- Neurologic


Neurologic: CNII-XII intact





- Allied Health


Allied health notes reviewed: nursing





HEART Score





- HEART Score


Troponin: 


                                        











Troponin T  0.013 ng/mL (0.00-0.029)   06/12/22  14:34    














Results





- Labs


CBC & Chem 7: 


                                 06/13/22 05:33





                                 06/13/22 05:33


Labs: 


                             Laboratory Last Values











WBC  8.1 K/mm3 (4.5-11.0)   06/13/22  05:33    


 


RBC  4.67 M/mm3 (3.65-5.03)   06/13/22  05:33    


 


Hgb  13.5 gm/dl (11.8-15.2)   06/13/22  05:33    


 


Hct  41.6 % (35.5-45.6)   06/13/22  05:33    


 


MCV  89 fl (84-94)   06/13/22  05:33    


 


MCH  29 pg (28-32)   06/13/22  05:33    


 


MCHC  33 % (32-34)   06/13/22  05:33    


 


RDW  19.5 % (13.2-15.2)  H  06/13/22  05:33    


 


Plt Count  132 K/mm3 (140-440)  L  06/13/22  05:33    


 


Lymph % (Auto)  7.4 % (13.4-35.0)  L  06/13/22  05:33    


 


Mono % (Auto)  2.6 % (0.0-7.3)   06/13/22  05:33    


 


Eos % (Auto)  0.0 % (0.0-4.3)   06/13/22  05:33    


 


Baso % (Auto)  0.2 % (0.0-1.8)   06/13/22  05:33    


 


Lymph # (Auto)  0.6 K/mm3 (1.2-5.4)  L  06/13/22  05:33    


 


Mono # (Auto)  0.2 K/mm3 (0.0-0.8)   06/13/22  05:33    


 


Eos # (Auto)  0.0 K/mm3 (0.0-0.4)   06/13/22  05:33    


 


Baso # (Auto)  0.0 K/mm3 (0.0-0.1)   06/13/22  05:33    


 


Seg Neutrophils %  89.8 % (40.0-70.0)  H  06/13/22  05:33    


 


Seg Neutrophils #  7.3 K/mm3 (1.8-7.7)   06/13/22  05:33    


 


PT  15.8 Sec. (12.2-14.9)  H  06/12/22  10:30    


 


INR  1.13  (0.87-1.13)   06/12/22  10:30    


 


APTT  29.1 Sec. (24.2-36.6)   06/12/22  10:30    


 


D-Dimer  690.26 ng/mlDDU (0-234)  H  06/12/22  14:34    


 


ABG pH  7.394 pH Units (7.350-7.450)   06/12/22  11:50    


 


ABG pCO2  42.1 mm Hg  06/12/22  11:50    


 


ABG pO2  62.6 mm Hg (80.0-90.0)  L  06/12/22  11:50    


 


ABG HCO3  25.2 mmol/L (20.0-26.0)   06/12/22  11:50    


 


ABG O2 Saturation  93.4 % (95.0-99.0)  L  06/12/22  11:50    


 


ABG O2 Content  17.7  (0.0-44)   06/12/22  11:50    


 


ABG Base Excess  0.2 mmol/L (-2.0-3.0)   06/12/22  11:50    


 


ABG Hemoglobin  13.9 gm/dl (14.0-18.0)  L  06/12/22  11:50    


 


ABG Carboxyhemoglobin  2.9 % (0.0-5.0)   06/12/22  11:50    


 


ABG Methemoglobin  0.6 % (0.0-1.5)   06/12/22  11:50    


 


Oxyhemoglobin  90.1 % (95.0-99.0)  L  06/12/22  11:50    


 


FiO2  21 %  06/12/22  11:50    


 


Sodium  138 mmol/L (137-145)   06/13/22  05:33    


 


Potassium  4.0 mmol/L (3.6-5.0)   06/13/22  05:33    


 


Chloride  104.9 mmol/L ()   06/13/22  05:33    


 


Carbon Dioxide  25 mmol/L (22-30)   06/13/22  05:33    


 


Anion Gap  12 mmol/L  06/13/22  05:33    


 


BUN  8 mg/dL (9-20)  L  06/13/22  05:33    


 


Creatinine  0.5 mg/dL (0.8-1.3)  L  06/13/22  05:33    


 


Estimated GFR  > 60 ml/min  06/13/22  05:33    


 


BUN/Creatinine Ratio  16 %  06/13/22  05:33    


 


Glucose  291 mg/dL ()  H  06/13/22  05:33    


 


Calcium  8.6 mg/dL (8.4-10.2)   06/13/22  05:33    


 


Magnesium  2.50 mg/dL (1.7-2.3)  H  06/12/22  10:30    


 


Ferritin  165.6 ng/mL (30.0-300.0)   06/12/22  14:34    


 


Total Bilirubin  1.10 mg/dL (0.1-1.2)   06/12/22  10:30    


 


AST  26 units/L (5-40)   06/12/22  10:30    


 


ALT  26 units/L (7-56)   06/12/22  10:30    


 


Alkaline Phosphatase  105 units/L ()   06/12/22  10:30    


 


Ammonia  67.0 umol/L (25-60)  H  06/12/22  10:30    


 


Lactate Dehydrogenase  386 units/L ()  H  06/12/22  14:34    


 


Troponin T  0.013 ng/mL (0.00-0.029)   06/12/22  14:34    


 


C-Reactive Protein  5.50 mg/dL (0.00-1.30)  H  06/12/22  14:34    


 


NT-Pro-B Natriuret Pep  2101 pg/mL (0-900)  H  06/12/22  10:30    


 


Total Protein  7.2 g/dL (6.3-8.2)   06/12/22  10:30    


 


Albumin  3.6 g/dL (3.9-5)  L  06/12/22  10:30    


 


Albumin/Globulin Ratio  1.0 %  06/12/22  10:30    


 


Procalcitonin  < 0.05 ng/mL (<0.15)   06/12/22  14:34    


 


Urine Color  Yellow  (Yellow)   06/12/22  09:33    


 


Urine Turbidity  Clear  (Clear)   06/12/22  09:33    


 


Urine pH  6.0  (5.0-7.0)   06/12/22  09:33    


 


Ur Specific Gravity  1.016  (1.003-1.030)   06/12/22  09:33    


 


Urine Protein  30 mg/dl mg/dL (Negative)   06/12/22  09:33    


 


Urine Glucose (UA)  50 mg/dL (Negative)   06/12/22  09:33    


 


Urine Ketones  Neg mg/dL (Negative)   06/12/22  09:33    


 


Urine Blood  Neg  (Negative)   06/12/22  09:33    


 


Urine Nitrite  Neg  (Negative)   06/12/22  09:33    


 


Urine Bilirubin  Neg  (Negative)   06/12/22  09:33    


 


Urine Urobilinogen  4.0 mg/dL (<2.0)   06/12/22  09:33    


 


Ur Leukocyte Esterase  Neg  (Negative)   06/12/22  09:33    


 


Urine WBC (Auto)  1.0 /HPF (0.0-6.0)   06/12/22  09:33    


 


Urine RBC (Auto)  1.0 /HPF (0.0-6.0)   06/12/22  09:33    


 


Urine Mucus  Few /HPF  06/12/22  09:33    


 


Urine Opiates Screen  Negative   06/12/22  11:04    


 


Urine Methadone Screen  Negative   06/12/22  11:04    


 


Ur Barbiturates Screen  Negative   06/12/22  11:04    


 


Ur Phencyclidine Scrn  Negative   06/12/22  11:04    


 


Ur Amphetamines Screen  Negative   06/12/22  11:04    


 


U Benzodiazepines Scrn  Negative   06/12/22  11:04    


 


Urine Cocaine Screen  Positive   06/12/22  11:04    


 


U Marijuana (THC) Screen  Negative   06/12/22  11:04    


 


Drugs of Abuse Note  Disclamer   06/12/22  11:04    


 


Plasma/Serum Alcohol  < 0.01 % (0-0.07)   06/12/22  10:30    


 


SARS-CoV-2 (PCR)  Negative  (Negative)   06/14/22  09:40    











Microbiology: 


Microbiology





06/12/22 10:30   Peripheral/Venous   Blood Culture - Preliminary


                            NO GROWTH AFTER 48 HOURS


06/12/22 10:30   Peripheral/Venous   Blood Culture - Preliminary


                            NO GROWTH AFTER 48 HOURS








[place]/IV: 


                                        





Voiding Method                   Toilet











Active Medications





- Current Medications


Current Medications: 














Generic Name Dose Route Start Last Admin





  Trade Name Freq  PRN Reason Stop Dose Admin


 


Acetaminophen  650 mg  06/12/22 14:13 





  Acetaminophen 325 Mg Tab  PO  





  Q4H PRN  





  Pain MILD(1-3)/Fever >100.5/HA  


 


Albuterol  2.5 mg  06/12/22 14:13 





  Albuterol 2.5 Mg/3 Ml Nebu  IH  





  Q4HRT PRN  





  Shortness Of Breath  


 


Ascorbic Acid  500 mg  06/12/22 22:00  06/14/22 09:40





  Ascorbic Acid 500 Mg Tab  PO   500 mg





  BID DELLA   Administration


 


Azithromycin  500 mg  06/13/22 10:00  06/14/22 09:40





  Azithromycin 250 Mg Tab  PO  06/16/22 10:01  500 mg





  QDAY DELLA   Administration





  Protocol  


 


Cholecalciferol  1,000 unit  06/13/22 10:00  06/14/22 09:41





  Cholecalciferol (Vit D3) 1000 Unit (25 Mcg) Tab  PO   1,000 unit





  QDAY DELLA   Administration


 


Folic Acid  1 mg  06/13/22 10:00  06/14/22 09:41





  Folic Acid 1 Mg Tab  PO   1 mg





  QDAY DELLA   Administration


 


Heparin Sodium (Porcine)  5,000 unit  06/12/22 22:00  06/14/22 09:41





  Heparin 5,000 Unit/1 Ml Vial  SUB-Q   5,000 unit





  Q12HR DELLA   Administration


 


Hydromorphone HCl  0.25 mg  06/12/22 14:13 





  Hydromorphone 0.5 Mg/0.5 Ml Inj  IV  





  Q23H PRN  





  Pain, Moderate (4-6)  


 


Ceftriaxone Sodium  2 gm in 100 mls @ 200 mls/hr  06/13/22 14:00  06/13/22 19:59





  Rocephin/Ns 2 Gm/100 Ml  IV  06/16/22 18:59  Infused





  Q24H DELLA   Infusion





  Protocol  


 


Lorazepam  2 mg  06/12/22 17:01  06/13/22 02:46





  Lorazepam 2 Mg/Ml Vial  IV   2 mg





  Q1HR PRN   Administration





  CIWA-Ar 8-15  


 


Lorazepam  4 mg  06/12/22 17:01 





  Lorazepam 2 Mg/Ml Vial  IV  





  Q1HR PRN  





  CIWA-Ar 16-25  


 


Losartan Potassium  50 mg  06/14/22 10:00  06/14/22 09:40





  Losartan 50 Mg Tab  PO   50 mg





  QDAY DELLA   Administration


 


Methylprednisolone Sodium Succinate  40 mg  06/12/22 18:00  06/14/22 02:46





  Methylprednisolone Sod Succinate 40 Mg/1 Ml Inj  IV   40 mg





  Q8H DELLA   Administration


 


Multivitamins  1 each  06/13/22 10:00  06/14/22 09:41





  Multivitamins ,Therapeutic Tab  PO   1 each





  DAILY DELLA   Administration


 


Nifedipine  60 mg  06/14/22 10:00  06/14/22 09:40





  Nifedipine Xl 60 Mg Tab  PO   60 mg





  QDAY DELLA   Administration


 


Ondansetron HCl  4 mg  06/12/22 14:13 





  Ondansetron 4 Mg/2 Ml Inj  IV  





  Q8H PRN  





  Nausea And Vomiting  


 


Oxycodone/Acetaminophen  1 tab  06/12/22 14:13 





  Oxycodone /Acetaminophen 5-325mg Tab  PO  





  Q16H PRN  





  Pain, Moderate (4-6)  


 


Sodium Chloride  10 ml  06/12/22 22:00  06/14/22 09:41





  Sodium Chloride 0.9% 10 Ml Flush Syringe  IV   10 ml





  BID DELLA   Administration


 


Sodium Chloride  10 ml  06/12/22 14:13 





  Sodium Chloride 0.9% 10 Ml Flush Syringe  IV  





  PRN PRN  





  LINE FLUSH  


 


Thiamine HCl  100 mg  06/13/22 10:00  06/14/22 09:42





  Thiamine 100 Mg Tab  PO   100 mg





  QDAY DELLA   Administration


 


Zinc Sulfate  220 mg  06/12/22 22:00  06/14/22 09:40





  Zinc Sulfate 220 Mg Cap  PO   220 mg





  BID DELLA   Administration














Nutrition/Malnutrition Assess





- Dietary Evaluation


Nutrition/Malnutrition Findings: 


                                        





Nutrition Notes                                            Start:  06/13/22 

19:25


Freq:                                                      Status: Active       




Protocol:                                                                       




 Document     06/13/22 19:25  CHADWICK  (Rec: 06/13/22 19:44  CHADWICK  [id number])


 Nutrition Notes


     Need for Assessment generated from:         RN Referral,MST


     Initial or Follow up                        Assessment


     Current Diagnosis                           Coronary Artery Disease,


                                                 Diabetes,Hypertension,


                                                 Respiratory Failure


     Other Pertinent Diagnosis                   COVID-19 pui, Asthma, s/p


                                                 Stent Placement, Pneumonia,


                                                 Substance Dependence.


     Current Diet                                Cardiac Diet (since D 06/12).


     Labs/Tests                                  06/13: BUN 8, Crea 0.5.


     Pertinent Medications                       06/13: Vit C, Vit D3, Folic


                                                 acid, Thiamine, ZnSO4, others


                                                 nutritionally unremarkable.


     Height                                      6 ft 2 in


     Weight                                      106.633 kg


     Ideal Body Weight (kg)                      86.36


     BMI                                         30.2


     Intake Prior to Admission                   Good


     Weight change and time frame                Pt states being unsure if loss


                                                 body weight PTA.


     Weight Status                               Obese


     Subjective/Other Information                RD consult for risk of


                                                 malnutrition assessment.


                                                 Pt's PO intake of meals has


                                                 been Good (100%),according to


                                                 ADL notes.


                                                 Pt is on Nasal Cannula, O2


                                                 saturation @ 96%, according to


                                                 Physical Assessment History


                                                 notes.


                                                 Pt shows no signs of concern


                                                 for risk of malnutrition at


                                                 the time, according to


                                                 Physical Assessment History


                                                 notes.


     Percent of energy/protein needs met:        Prescribed Cardiac Diet


                                                 provides for energy/protein


                                                 needs (2,230 Kcal/85 g) during


                                                 LOS.


     Burn                                        Absent


     Trauma                                      Absent


     GI Symptoms                                 None


     Food Allergy                                No


     Skin Integrity/Comment                      Assessment WNL.


     Current % PO                                Good (%)


     Minimum of two criteria                     No


     Fluid Accumulation                          N/A


     Reduced  Strength                       N/A (non-severe)


     Protein-Calorie Malnutrition                N\A


     #1


      Nutrition Diagnosis                        No nutrition diagnosis at this


                                                 time


     Is patient on ventilator?                   No


     Is Patient Ambulatory and/or Out of Bed     Yes


     REE-([facility]-ambulatory/OOB) [     2568.904


      [email or web address]]                               


     Kcal/Kg value to use for calculation        22


     Approximate Energy Requirements Using       2346


      kcal/Kg                                    


     Calculation Used for Recommendations        Kcal/kg


     Additional Notes                            Protein: 0.8-1 g/Kg AdjBW; 78-


                                                 97 g/day.


                                                 Fluids: 1 ml/Kcal, or as per


                                                 MD.


 Nutrition Intervention


     Change Diet Order:                          Continue Cardiac Diet.


     Follow-Up By:                               06/20/22


     Additional Comments                         Continue monitoring food


                                                 tolerance, %PO intake of meals


                                                 , and BM.

## 2022-06-15 VITALS — SYSTOLIC BLOOD PRESSURE: 172 MMHG | DIASTOLIC BLOOD PRESSURE: 99 MMHG

## 2022-06-15 RX ADMIN — Medication SCH MG: at 10:37

## 2022-06-15 RX ADMIN — OXYCODONE HYDROCHLORIDE AND ACETAMINOPHEN SCH MG: 500 TABLET ORAL at 10:36

## 2022-06-15 RX ADMIN — AZITHROMYCIN SCH MG: 250 TABLET, FILM COATED ORAL at 10:38

## 2022-06-15 RX ADMIN — NIFEDIPINE SCH MG: 60 TABLET, EXTENDED RELEASE ORAL at 10:38

## 2022-06-15 RX ADMIN — HEPARIN SODIUM SCH UNIT: 5000 INJECTION, SOLUTION INTRAVENOUS; SUBCUTANEOUS at 10:38

## 2022-06-15 RX ADMIN — Medication SCH ML: at 10:38

## 2022-06-15 RX ADMIN — METHYLPREDNISOLONE SODIUM SUCCINATE SCH MG: 40 INJECTION, POWDER, FOR SOLUTION INTRAMUSCULAR; INTRAVENOUS at 03:03

## 2022-06-15 RX ADMIN — FOLIC ACID SCH MG: 1 TABLET ORAL at 10:36

## 2022-06-15 RX ADMIN — LOSARTAN POTASSIUM SCH MG: 50 TABLET, FILM COATED ORAL at 10:37

## 2022-06-15 RX ADMIN — Medication SCH UNIT: at 10:37

## 2022-06-15 RX ADMIN — MULTIVITAMIN TABLET SCH EACH: TABLET at 10:37

## 2022-06-15 NOTE — DISCHARGE SUMMARY
Providers





- Providers


Date of Admission: 


06/12/22 14:14





Date of discharge: 06/15/22


Attending physician: 


LILI NICHOLAS MD





Primary care physician: 


PRIMARY CARE MD








Hospitalization


Reason for admission: Acute hypoxic respiratory failure


Condition: Stable


Pertinent studies: 





Reviewed.


Procedures: 





None.


Hospital course: 





53 YO Male with HTN, Asthma, CAD S/P Stent Placement, DM presents to ED for 

evaluation.  Patient is lethargic at time of evaluation and provides minimal 

history.  Patient reports "I feel short of breath".  Additional history taken 

EMS staff, as well as ED staff.  EMS was notified for the aforementioned 

symptoms and upon arrival to the patient's extended stay hotel room the patient 

was found to be in distress and subsequently transported to [facility] for further care

and evaluation of the aforementioned symptoms.  The patient was seen and 

evaluated in the emergency department.  All lab and imaging studies reviewed.  

Patient found to have a pulse oximetry of 88% on room air which is consistent 

with acute hypoxemic respiratory failure.  Chest x-ray revealed bilateral 

pneumonia.  Patient admitted to medical floor and initiated on pneumonia 

protocol as well as coronavirus protocol due to increased risk of worsening 

symptoms and for medical stabilization.  No reports of fever, chills, chest 

pain, palpitation, skin rash, recent contact, productive cough, known exposure 

to COVID-19.  Patient was initiated on azithromycin and Rocephin for antibiotic 

coverage of community-acquired pneumonia.  The patient was found to have an 

unremarkable coronavirus PCR, and the COVID-19 protocol was since discontinued. 

Patient required supplemental oxygen that is since been weaned down to room air.

 The patient has been counseled at length about the importance of cocaine 

cessation, and he expresses understanding.  Patient was found to have an 

elevated BNP that was likely secondary to his cocaine usage.  Patient was also 

found to have an elevated D-dimer; however, the patient had an unremarkable CT 

angio chest ruling out pulmonary embolism.  Patient will complete a 5-day course

of antibiotics, and he has been recommended to follow-up with his primary care 

provider he expresses understanding.  The patient medically clear for discharge.


Disposition: 01 HOME / SELF CARE / HOMELESS


Final Discharge Diagnosis (Prints w/discharge instructions): Community-acquired 

pneumonia, acute hypoxic respiratory failure, elevated BNP, elevated D-dimer, 

hypertension, mild protein caloric malnutrition, polysubstance dependence, 

alcohol dependence, obesity.


Time spent for discharge: 45 min 





Core Measure Documentation





- Palliative Care


Palliative Care/ Comfort Measures: Not Applicable





- Core Measures


Any of the following diagnoses?: none





Exam





- Constitutional


Vitals: 


                                        











Temp Pulse Resp BP Pulse Ox


 


 97.9 F   94 H  18   172/99   96 


 


 06/15/22 12:00  06/15/22 12:00  06/15/22 12:00  06/15/22 12:00  06/15/22 12:00











General appearance: Present: no acute distress, well-nourished, obese





- EENT


Eyes: Present: PERRL, EOM intact


ENT: hearing intact, clear oral mucosa, dentition normal





- Neck


Neck: Present: supple, normal ROM





- Respiratory


Respiratory effort: normal


Respiratory: bilateral: diminished





- Cardiovascular


Rhythm: regular


Heart Sounds: Present: S1 & S2





- Extremities


Extremities: no ischemia, pulses intact, pulses symmetrical, No edema, normal 

temperature, normal color, Full ROM


Peripheral Pulses: within normal limits





- Abdominal


General gastrointestinal: Present: soft, non-tender, non-distended, normal bowel

sounds


Male genitourinary: Present: deferred





- Rectal


Rectal Exam: deferred





- Integumentary


Integumentary: Present: clear, warm, dry





- Musculoskeletal


Musculoskeletal: strength equal bilaterally





- Psychiatric


Psychiatric: appropriate mood/affect, memory intact, cooperative





- Neurologic


Neurologic: CNII-XII intact, moves all extremities





- Allied Health


Allied health notes reviewed: nursing





Plan


Activity: no restrictions


Diet: low salt


Additional Instructions: 53 YO Male with HTN, Asthma, CAD S/P Stent Placement, 

DM presents to ED for evaluation.  Patient is lethargic at time of evaluation 

and provides minimal history.  Patient reports "I feel short of breath".  

Additional history taken EMS staff, as well as ED staff.  EMS was notified for 

the aforementioned symptoms and upon arrival to the patient's extended stay 

hotel room the patient was found to be in distress and subsequently transported 

to [facility] for further care and evaluation of the aforementioned symptoms.  The 

patient was seen and evaluated in the emergency department.  All lab and imaging

studies reviewed.  Patient found to have a pulse oximetry of 88% on room air 

which is consistent with acute hypoxemic respiratory failure.  Chest x-ray 

revealed bilateral pneumonia.  Patient admitted to medical floor and initiated 

on pneumonia protocol as well as coronavirus protocol due to increased risk of 

worsening symptoms and for medical stabilization.  No reports of fever, chills, 

chest pain, palpitation, skin rash, recent contact, productive cough, known 

exposure to COVID-19.  Patient was initiated on azithromycin and Rocephin for 

antibiotic coverage of community-acquired pneumonia.  The patient was found to 

have an unremarkable coronavirus PCR, and the COVID-19 protocol was since 

discontinued.  Patient required supplemental oxygen that is since been weaned 

down to room air.  The patient has been counseled at length about the importance

of cocaine cessation, and he expresses understanding.  Patient was found to have

an elevated BNP that was likely secondary to his cocaine usage.  Patient was 

also found to have an elevated D-dimer; however, the patient had an unremarkable

CT angio chest ruling out pulmonary embolism.  Patient will complete a 5-day 

course of antibiotics, and he has been recommended to follow-up with his primary

care provider he expresses understanding.  The patient medically clear for 

discharge.


Care Plan Goals: 


Patient is medically clear for discharge.


Assessment: 


53 YO Male with HTN, Asthma, CAD S/P Stent Placement, DM presents to ED for 

evaluation.  Patient is lethargic at time of evaluation and provides minimal 

history.  Patient reports "I feel short of breath".  Additional history taken 

EMS staff, as well as ED staff.  EMS was notified for the aforementioned 

symptoms and upon arrival to the patient's extended stay hotel room the patient 

was found to be in distress and subsequently transported to [facility] for further care

and evaluation of the aforementioned symptoms.  The patient was seen and 

evaluated in the emergency department.  All lab and imaging studies reviewed.  

Patient found to have a pulse oximetry of 88% on room air which is consistent 

with acute hypoxemic respiratory failure.  Chest x-ray revealed bilateral 

pneumonia.  Patient admitted to medical floor and initiated on pneumonia 

protocol as well as coronavirus protocol due to increased risk of worsening 

symptoms and for medical stabilization.  No reports of fever, chills, chest 

pain, palpitation, skin rash, recent contact, productive cough, known exposure 

to COVID-19.  Patient was initiated on azithromycin and Rocephin for antibiotic 

coverage of community-acquired pneumonia.  The patient was found to have an 

unremarkable coronavirus PCR, and the COVID-19 protocol was since discontinued. 

Patient required supplemental oxygen that is since been weaned down to room air.

 The patient has been counseled at length about the importance of cocaine c

essation, and he expresses understanding.  Patient was found to have an elevated

BNP that was likely secondary to his cocaine usage.  Patient was also found to 

have an elevated D-dimer; however, the patient had an unremarkable CT angio 

chest ruling out pulmonary embolism.  Patient will complete a 5-day course of 

antibiotics, and he has been recommended to follow-up with his primary care 

provider he expresses understanding.  The patient medically clear for discharge.


Follow up with: 


PRIMARY CARE,MD [Primary Care Provider] - 7 Days


Prescriptions: 


Losartan [Cozaar] 50 mg PO QDAY #30 tablet


NIFEdipine XL [Procardia Xl] 60 mg PO QDAY #30 tablet
